# Patient Record
Sex: FEMALE | Race: WHITE | NOT HISPANIC OR LATINO | Employment: FULL TIME | URBAN - NONMETROPOLITAN AREA
[De-identification: names, ages, dates, MRNs, and addresses within clinical notes are randomized per-mention and may not be internally consistent; named-entity substitution may affect disease eponyms.]

---

## 2018-03-05 DIAGNOSIS — R07.89 CHEST PRESSURE: Primary | ICD-10-CM

## 2018-03-08 DIAGNOSIS — R07.89 CHEST PRESSURE: Primary | ICD-10-CM

## 2018-03-23 ENCOUNTER — TELEPHONE (OUTPATIENT)
Dept: CARDIOLOGY | Facility: OTHER | Age: 58
End: 2018-03-23

## 2018-03-23 NOTE — TELEPHONE ENCOUNTER
Patient verified .  Reminder call for stress test with instructions given.  Patient verbalized understanding.

## 2018-03-30 ENCOUNTER — HOSPITAL ENCOUNTER (OUTPATIENT)
Dept: MRI IMAGING | Facility: OTHER | Age: 58
Discharge: HOME | End: 2018-03-30
Attending: PHYSICIAN ASSISTANT | Admitting: PHYSICIAN ASSISTANT

## 2018-03-30 DIAGNOSIS — G89.29 CHRONIC NONINTRACTABLE HEADACHE, UNSPECIFIED HEADACHE TYPE: ICD-10-CM

## 2018-03-30 DIAGNOSIS — R51.9 CHRONIC NONINTRACTABLE HEADACHE, UNSPECIFIED HEADACHE TYPE: ICD-10-CM

## 2018-03-30 PROCEDURE — 25500064 ZZH RX 255 OP 636: Performed by: FAMILY MEDICINE

## 2018-03-30 PROCEDURE — 70553 MRI BRAIN STEM W/O & W/DYE: CPT

## 2018-03-30 PROCEDURE — A9575 INJ GADOTERATE MEGLUMI 0.1ML: HCPCS | Performed by: FAMILY MEDICINE

## 2018-03-30 RX ORDER — GADOTERATE MEGLUMINE 376.9 MG/ML
15 INJECTION INTRAVENOUS ONCE
Status: COMPLETED | OUTPATIENT
Start: 2018-03-30 | End: 2018-03-30

## 2018-03-30 RX ADMIN — GADOTERATE MEGLUMINE 15 ML: 376.9 INJECTION INTRAVENOUS at 09:10

## 2021-10-18 ENCOUNTER — LAB (OUTPATIENT)
Dept: LAB | Facility: OTHER | Age: 61
End: 2021-10-18
Attending: CHIROPRACTOR

## 2021-10-18 DIAGNOSIS — Z01.89 LABORATORY PROCEDURE: Primary | ICD-10-CM

## 2021-10-18 PROCEDURE — 36415 COLL VENOUS BLD VENIPUNCTURE: CPT | Mod: ZL

## 2024-05-15 ENCOUNTER — HOSPITAL ENCOUNTER (EMERGENCY)
Facility: OTHER | Age: 64
Discharge: HOME | End: 2024-05-15
Attending: FAMILY MEDICINE | Admitting: FAMILY MEDICINE

## 2024-05-15 ENCOUNTER — ANESTHESIA (OUTPATIENT)
Dept: SURGERY | Facility: OTHER | Age: 64
End: 2024-05-15

## 2024-05-15 ENCOUNTER — APPOINTMENT (OUTPATIENT)
Dept: CT IMAGING | Facility: OTHER | Age: 64
End: 2024-05-15
Attending: FAMILY MEDICINE

## 2024-05-15 ENCOUNTER — ANESTHESIA EVENT (OUTPATIENT)
Dept: SURGERY | Facility: OTHER | Age: 64
End: 2024-05-15

## 2024-05-15 VITALS
DIASTOLIC BLOOD PRESSURE: 73 MMHG | HEIGHT: 60 IN | BODY MASS INDEX: 27.48 KG/M2 | WEIGHT: 140 LBS | OXYGEN SATURATION: 97 % | TEMPERATURE: 97.1 F | HEART RATE: 95 BPM | SYSTOLIC BLOOD PRESSURE: 110 MMHG | RESPIRATION RATE: 16 BRPM

## 2024-05-15 DIAGNOSIS — K35.30 ACUTE APPENDICITIS WITH LOCALIZED PERITONITIS, WITHOUT PERFORATION, ABSCESS, OR GANGRENE: Primary | ICD-10-CM

## 2024-05-15 LAB
ALBUMIN SERPL BCG-MCNC: 4.7 G/DL (ref 3.5–5.2)
ALBUMIN UR-MCNC: NEGATIVE MG/DL
ALP SERPL-CCNC: 78 U/L (ref 40–150)
ALT SERPL W P-5'-P-CCNC: 29 U/L (ref 0–50)
ANION GAP SERPL CALCULATED.3IONS-SCNC: 15 MMOL/L (ref 7–15)
APPEARANCE UR: CLEAR
AST SERPL W P-5'-P-CCNC: 19 U/L (ref 0–45)
BASOPHILS # BLD AUTO: 0.1 10E3/UL (ref 0–0.2)
BASOPHILS NFR BLD AUTO: 0 %
BILIRUB SERPL-MCNC: 0.3 MG/DL
BILIRUB UR QL STRIP: NEGATIVE
BUN SERPL-MCNC: 24.5 MG/DL (ref 8–23)
CALCIUM SERPL-MCNC: 9.4 MG/DL (ref 8.8–10.2)
CHLORIDE SERPL-SCNC: 102 MMOL/L (ref 98–107)
COLOR UR AUTO: ABNORMAL
CREAT SERPL-MCNC: 0.61 MG/DL (ref 0.51–0.95)
DEPRECATED HCO3 PLAS-SCNC: 21 MMOL/L (ref 22–29)
EGFRCR SERPLBLD CKD-EPI 2021: >90 ML/MIN/1.73M2
EOSINOPHIL # BLD AUTO: 0 10E3/UL (ref 0–0.7)
EOSINOPHIL NFR BLD AUTO: 0 %
ERYTHROCYTE [DISTWIDTH] IN BLOOD BY AUTOMATED COUNT: 12.3 % (ref 10–15)
GLUCOSE SERPL-MCNC: 128 MG/DL (ref 70–99)
GLUCOSE UR STRIP-MCNC: NEGATIVE MG/DL
HCT VFR BLD AUTO: 39.6 % (ref 35–47)
HGB BLD-MCNC: 13.2 G/DL (ref 11.7–15.7)
HGB UR QL STRIP: NEGATIVE
HOLD SPECIMEN: NORMAL
HOLD SPECIMEN: NORMAL
IMM GRANULOCYTES # BLD: 0.1 10E3/UL
IMM GRANULOCYTES NFR BLD: 0 %
KETONES UR STRIP-MCNC: ABNORMAL MG/DL
LACTATE SERPL-SCNC: 1.2 MMOL/L (ref 0.7–2)
LACTATE SERPL-SCNC: 2.9 MMOL/L (ref 0.7–2)
LEUKOCYTE ESTERASE UR QL STRIP: NEGATIVE
LIPASE SERPL-CCNC: 50 U/L (ref 13–60)
LYMPHOCYTES # BLD AUTO: 2.1 10E3/UL (ref 0.8–5.3)
LYMPHOCYTES NFR BLD AUTO: 13 %
MCH RBC QN AUTO: 29 PG (ref 26.5–33)
MCHC RBC AUTO-ENTMCNC: 33.3 G/DL (ref 31.5–36.5)
MCV RBC AUTO: 87 FL (ref 78–100)
MONOCYTES # BLD AUTO: 1 10E3/UL (ref 0–1.3)
MONOCYTES NFR BLD AUTO: 6 %
MUCOUS THREADS #/AREA URNS LPF: PRESENT /LPF
NEUTROPHILS # BLD AUTO: 13.3 10E3/UL (ref 1.6–8.3)
NEUTROPHILS NFR BLD AUTO: 81 %
NITRATE UR QL: NEGATIVE
NRBC # BLD AUTO: 0 10E3/UL
NRBC BLD AUTO-RTO: 0 /100
PH UR STRIP: 7 [PH] (ref 5–9)
PLATELET # BLD AUTO: 318 10E3/UL (ref 150–450)
POTASSIUM SERPL-SCNC: 4 MMOL/L (ref 3.4–5.3)
PROT SERPL-MCNC: 7.8 G/DL (ref 6.4–8.3)
RBC # BLD AUTO: 4.55 10E6/UL (ref 3.8–5.2)
RBC URINE: 2 /HPF
SODIUM SERPL-SCNC: 138 MMOL/L (ref 135–145)
SP GR UR STRIP: 1.04 (ref 1–1.03)
TROPONIN T SERPL HS-MCNC: 10 NG/L
UROBILINOGEN UR STRIP-MCNC: NORMAL MG/DL
WBC # BLD AUTO: 16.5 10E3/UL (ref 4–11)
WBC URINE: <1 /HPF

## 2024-05-15 PROCEDURE — 99285 EMERGENCY DEPT VISIT HI MDM: CPT | Mod: 25 | Performed by: FAMILY MEDICINE

## 2024-05-15 PROCEDURE — 88304 TISSUE EXAM BY PATHOLOGIST: CPT

## 2024-05-15 PROCEDURE — 250N000025 HC SEVOFLURANE, PER MIN: Performed by: SURGERY

## 2024-05-15 PROCEDURE — 83605 ASSAY OF LACTIC ACID: CPT | Performed by: FAMILY MEDICINE

## 2024-05-15 PROCEDURE — 360N000076 HC SURGERY LEVEL 3, PER MIN: Performed by: SURGERY

## 2024-05-15 PROCEDURE — 250N000011 HC RX IP 250 OP 636: Performed by: FAMILY MEDICINE

## 2024-05-15 PROCEDURE — 999N000141 HC STATISTIC PRE-PROCEDURE NURSING ASSESSMENT: Performed by: SURGERY

## 2024-05-15 PROCEDURE — 96375 TX/PRO/DX INJ NEW DRUG ADDON: CPT | Performed by: FAMILY MEDICINE

## 2024-05-15 PROCEDURE — 258N000003 HC RX IP 258 OP 636

## 2024-05-15 PROCEDURE — 96365 THER/PROPH/DIAG IV INF INIT: CPT | Mod: XU | Performed by: FAMILY MEDICINE

## 2024-05-15 PROCEDURE — 250N000011 HC RX IP 250 OP 636

## 2024-05-15 PROCEDURE — 36415 COLL VENOUS BLD VENIPUNCTURE: CPT | Performed by: FAMILY MEDICINE

## 2024-05-15 PROCEDURE — 250N000011 HC RX IP 250 OP 636: Performed by: SURGERY

## 2024-05-15 PROCEDURE — 258N000003 HC RX IP 258 OP 636: Performed by: NURSE ANESTHETIST, CERTIFIED REGISTERED

## 2024-05-15 PROCEDURE — 44970 LAPAROSCOPY APPENDECTOMY: CPT | Performed by: SURGERY

## 2024-05-15 PROCEDURE — 99285 EMERGENCY DEPT VISIT HI MDM: CPT | Performed by: FAMILY MEDICINE

## 2024-05-15 PROCEDURE — 710N000012 HC RECOVERY PHASE 2, PER MINUTE: Performed by: SURGERY

## 2024-05-15 PROCEDURE — 84484 ASSAY OF TROPONIN QUANT: CPT | Performed by: FAMILY MEDICINE

## 2024-05-15 PROCEDURE — 96376 TX/PRO/DX INJ SAME DRUG ADON: CPT | Performed by: FAMILY MEDICINE

## 2024-05-15 PROCEDURE — 258N000003 HC RX IP 258 OP 636: Performed by: FAMILY MEDICINE

## 2024-05-15 PROCEDURE — 83690 ASSAY OF LIPASE: CPT | Performed by: FAMILY MEDICINE

## 2024-05-15 PROCEDURE — 250N000013 HC RX MED GY IP 250 OP 250 PS 637: Performed by: SURGERY

## 2024-05-15 PROCEDURE — 99140 ANES COMP EMERGENCY COND: CPT

## 2024-05-15 PROCEDURE — 44970 LAPAROSCOPY APPENDECTOMY: CPT

## 2024-05-15 PROCEDURE — 250N000009 HC RX 250: Performed by: SURGERY

## 2024-05-15 PROCEDURE — 710N000010 HC RECOVERY PHASE 1, LEVEL 2, PER MIN: Performed by: SURGERY

## 2024-05-15 PROCEDURE — 81001 URINALYSIS AUTO W/SCOPE: CPT | Performed by: FAMILY MEDICINE

## 2024-05-15 PROCEDURE — 85025 COMPLETE CBC W/AUTO DIFF WBC: CPT | Performed by: FAMILY MEDICINE

## 2024-05-15 PROCEDURE — 74177 CT ABD & PELVIS W/CONTRAST: CPT

## 2024-05-15 PROCEDURE — 250N000013 HC RX MED GY IP 250 OP 250 PS 637

## 2024-05-15 PROCEDURE — 272N000001 HC OR GENERAL SUPPLY STERILE: Performed by: SURGERY

## 2024-05-15 PROCEDURE — 370N000017 HC ANESTHESIA TECHNICAL FEE, PER MIN: Performed by: SURGERY

## 2024-05-15 PROCEDURE — 80053 COMPREHEN METABOLIC PANEL: CPT | Performed by: FAMILY MEDICINE

## 2024-05-15 PROCEDURE — 250N000009 HC RX 250

## 2024-05-15 RX ORDER — NALOXONE HYDROCHLORIDE 0.4 MG/ML
0.4 INJECTION, SOLUTION INTRAMUSCULAR; INTRAVENOUS; SUBCUTANEOUS
Status: DISCONTINUED | OUTPATIENT
Start: 2024-05-15 | End: 2024-05-15 | Stop reason: HOSPADM

## 2024-05-15 RX ORDER — BUPIVACAINE HYDROCHLORIDE AND EPINEPHRINE 5; 5 MG/ML; UG/ML
INJECTION, SOLUTION EPIDURAL; INTRACAUDAL; PERINEURAL PRN
Status: DISCONTINUED | OUTPATIENT
Start: 2024-05-15 | End: 2024-05-15 | Stop reason: HOSPADM

## 2024-05-15 RX ORDER — ALBUTEROL SULFATE 90 UG/1
AEROSOL, METERED RESPIRATORY (INHALATION) PRN
Status: DISCONTINUED | OUTPATIENT
Start: 2024-05-15 | End: 2024-05-15

## 2024-05-15 RX ORDER — KETOROLAC TROMETHAMINE 30 MG/ML
30 INJECTION, SOLUTION INTRAMUSCULAR; INTRAVENOUS ONCE
Status: COMPLETED | OUTPATIENT
Start: 2024-05-15 | End: 2024-05-15

## 2024-05-15 RX ORDER — ONDANSETRON 2 MG/ML
4 INJECTION INTRAMUSCULAR; INTRAVENOUS EVERY 30 MIN PRN
Status: DISCONTINUED | OUTPATIENT
Start: 2024-05-15 | End: 2024-05-15 | Stop reason: HOSPADM

## 2024-05-15 RX ORDER — LIDOCAINE 40 MG/G
CREAM TOPICAL
Status: DISCONTINUED | OUTPATIENT
Start: 2024-05-15 | End: 2024-05-15 | Stop reason: HOSPADM

## 2024-05-15 RX ORDER — SODIUM CHLORIDE, SODIUM LACTATE, POTASSIUM CHLORIDE, CALCIUM CHLORIDE 600; 310; 30; 20 MG/100ML; MG/100ML; MG/100ML; MG/100ML
INJECTION, SOLUTION INTRAVENOUS CONTINUOUS
Status: DISCONTINUED | OUTPATIENT
Start: 2024-05-15 | End: 2024-05-15 | Stop reason: HOSPADM

## 2024-05-15 RX ORDER — ONDANSETRON 2 MG/ML
4 INJECTION INTRAMUSCULAR; INTRAVENOUS ONCE
Status: COMPLETED | OUTPATIENT
Start: 2024-05-15 | End: 2024-05-15

## 2024-05-15 RX ORDER — PROPOFOL 10 MG/ML
INJECTION, EMULSION INTRAVENOUS PRN
Status: DISCONTINUED | OUTPATIENT
Start: 2024-05-15 | End: 2024-05-15

## 2024-05-15 RX ORDER — ACETAMINOPHEN 325 MG/1
975 TABLET ORAL ONCE
Status: COMPLETED | OUTPATIENT
Start: 2024-05-15 | End: 2024-05-15

## 2024-05-15 RX ORDER — HYDROMORPHONE HYDROCHLORIDE 1 MG/ML
0.5 INJECTION, SOLUTION INTRAMUSCULAR; INTRAVENOUS; SUBCUTANEOUS ONCE
Status: COMPLETED | OUTPATIENT
Start: 2024-05-15 | End: 2024-05-15

## 2024-05-15 RX ORDER — GLYCOPYRROLATE 0.2 MG/ML
INJECTION, SOLUTION INTRAMUSCULAR; INTRAVENOUS PRN
Status: DISCONTINUED | OUTPATIENT
Start: 2024-05-15 | End: 2024-05-15

## 2024-05-15 RX ORDER — HYDROMORPHONE HCL IN WATER/PF 6 MG/30 ML
0.2 PATIENT CONTROLLED ANALGESIA SYRINGE INTRAVENOUS EVERY 5 MIN PRN
Status: DISCONTINUED | OUTPATIENT
Start: 2024-05-15 | End: 2024-05-15 | Stop reason: HOSPADM

## 2024-05-15 RX ORDER — NALOXONE HYDROCHLORIDE 0.4 MG/ML
0.1 INJECTION, SOLUTION INTRAMUSCULAR; INTRAVENOUS; SUBCUTANEOUS
Status: DISCONTINUED | OUTPATIENT
Start: 2024-05-15 | End: 2024-05-15 | Stop reason: HOSPADM

## 2024-05-15 RX ORDER — DEXAMETHASONE SODIUM PHOSPHATE 4 MG/ML
INJECTION, SOLUTION INTRA-ARTICULAR; INTRALESIONAL; INTRAMUSCULAR; INTRAVENOUS; SOFT TISSUE PRN
Status: DISCONTINUED | OUTPATIENT
Start: 2024-05-15 | End: 2024-05-15

## 2024-05-15 RX ORDER — FENTANYL CITRATE 50 UG/ML
50 INJECTION, SOLUTION INTRAMUSCULAR; INTRAVENOUS EVERY 5 MIN PRN
Status: DISCONTINUED | OUTPATIENT
Start: 2024-05-15 | End: 2024-05-15 | Stop reason: HOSPADM

## 2024-05-15 RX ORDER — ONDANSETRON 4 MG/1
4 TABLET, ORALLY DISINTEGRATING ORAL EVERY 30 MIN PRN
Status: DISCONTINUED | OUTPATIENT
Start: 2024-05-15 | End: 2024-05-15 | Stop reason: HOSPADM

## 2024-05-15 RX ORDER — LIDOCAINE HYDROCHLORIDE 20 MG/ML
INJECTION, SOLUTION INFILTRATION; PERINEURAL PRN
Status: DISCONTINUED | OUTPATIENT
Start: 2024-05-15 | End: 2024-05-15

## 2024-05-15 RX ORDER — HYDROMORPHONE HCL IN WATER/PF 6 MG/30 ML
0.4 PATIENT CONTROLLED ANALGESIA SYRINGE INTRAVENOUS EVERY 5 MIN PRN
Status: DISCONTINUED | OUTPATIENT
Start: 2024-05-15 | End: 2024-05-15 | Stop reason: HOSPADM

## 2024-05-15 RX ORDER — DEXAMETHASONE SODIUM PHOSPHATE 4 MG/ML
4 INJECTION, SOLUTION INTRA-ARTICULAR; INTRALESIONAL; INTRAMUSCULAR; INTRAVENOUS; SOFT TISSUE
Status: DISCONTINUED | OUTPATIENT
Start: 2024-05-15 | End: 2024-05-15 | Stop reason: HOSPADM

## 2024-05-15 RX ORDER — ONDANSETRON 2 MG/ML
INJECTION INTRAMUSCULAR; INTRAVENOUS PRN
Status: DISCONTINUED | OUTPATIENT
Start: 2024-05-15 | End: 2024-05-15

## 2024-05-15 RX ORDER — FENTANYL CITRATE 50 UG/ML
25 INJECTION, SOLUTION INTRAMUSCULAR; INTRAVENOUS EVERY 5 MIN PRN
Status: DISCONTINUED | OUTPATIENT
Start: 2024-05-15 | End: 2024-05-15 | Stop reason: HOSPADM

## 2024-05-15 RX ORDER — SCOLOPAMINE TRANSDERMAL SYSTEM 1 MG/1
1 PATCH, EXTENDED RELEASE TRANSDERMAL ONCE
Status: DISCONTINUED | OUTPATIENT
Start: 2024-05-15 | End: 2024-05-15 | Stop reason: HOSPADM

## 2024-05-15 RX ORDER — NALOXONE HYDROCHLORIDE 0.4 MG/ML
0.2 INJECTION, SOLUTION INTRAMUSCULAR; INTRAVENOUS; SUBCUTANEOUS
Status: DISCONTINUED | OUTPATIENT
Start: 2024-05-15 | End: 2024-05-15 | Stop reason: HOSPADM

## 2024-05-15 RX ORDER — FENTANYL CITRATE 50 UG/ML
INJECTION, SOLUTION INTRAMUSCULAR; INTRAVENOUS PRN
Status: DISCONTINUED | OUTPATIENT
Start: 2024-05-15 | End: 2024-05-15

## 2024-05-15 RX ORDER — HYDROMORPHONE HYDROCHLORIDE 1 MG/ML
0.5 INJECTION, SOLUTION INTRAMUSCULAR; INTRAVENOUS; SUBCUTANEOUS EVERY 30 MIN PRN
Status: DISCONTINUED | OUTPATIENT
Start: 2024-05-15 | End: 2024-05-15 | Stop reason: HOSPADM

## 2024-05-15 RX ORDER — IOPAMIDOL 755 MG/ML
81 INJECTION, SOLUTION INTRAVASCULAR ONCE
Status: COMPLETED | OUTPATIENT
Start: 2024-05-15 | End: 2024-05-15

## 2024-05-15 RX ORDER — ACETAMINOPHEN 325 MG/1
650 TABLET ORAL EVERY 4 HOURS PRN
Qty: 50 TABLET | Refills: 0 | Status: SHIPPED | OUTPATIENT
Start: 2024-05-15

## 2024-05-15 RX ORDER — IBUPROFEN 200 MG
600 TABLET ORAL EVERY 6 HOURS PRN
COMMUNITY
Start: 2024-05-15

## 2024-05-15 RX ORDER — HYDROCODONE BITARTRATE AND ACETAMINOPHEN 5; 325 MG/1; MG/1
1 TABLET ORAL
Status: DISCONTINUED | OUTPATIENT
Start: 2024-05-15 | End: 2024-05-15 | Stop reason: HOSPADM

## 2024-05-15 RX ORDER — KETOROLAC TROMETHAMINE 30 MG/ML
30 INJECTION, SOLUTION INTRAMUSCULAR; INTRAVENOUS EVERY 6 HOURS PRN
Status: DISCONTINUED | OUTPATIENT
Start: 2024-05-15 | End: 2024-05-15 | Stop reason: HOSPADM

## 2024-05-15 RX ORDER — KETAMINE HYDROCHLORIDE 10 MG/ML
INJECTION INTRAMUSCULAR; INTRAVENOUS PRN
Status: DISCONTINUED | OUTPATIENT
Start: 2024-05-15 | End: 2024-05-15

## 2024-05-15 RX ORDER — PIPERACILLIN SODIUM, TAZOBACTAM SODIUM 3; .375 G/15ML; G/15ML
3.38 INJECTION, POWDER, LYOPHILIZED, FOR SOLUTION INTRAVENOUS EVERY 6 HOURS
Status: DISCONTINUED | OUTPATIENT
Start: 2024-05-15 | End: 2024-05-15 | Stop reason: HOSPADM

## 2024-05-15 RX ADMIN — PROPOFOL 150 MG: 10 INJECTION, EMULSION INTRAVENOUS at 13:45

## 2024-05-15 RX ADMIN — KETOROLAC TROMETHAMINE 30 MG: 30 INJECTION, SOLUTION INTRAMUSCULAR at 08:44

## 2024-05-15 RX ADMIN — HYDROMORPHONE HYDROCHLORIDE 0.5 MG: 1 INJECTION, SOLUTION INTRAMUSCULAR; INTRAVENOUS; SUBCUTANEOUS at 08:44

## 2024-05-15 RX ADMIN — SODIUM CHLORIDE 1000 ML: 9 INJECTION, SOLUTION INTRAVENOUS at 08:01

## 2024-05-15 RX ADMIN — FENTANYL CITRATE 50 MCG: 50 INJECTION INTRAMUSCULAR; INTRAVENOUS at 13:45

## 2024-05-15 RX ADMIN — IOPAMIDOL 81 ML: 755 INJECTION, SOLUTION INTRAVENOUS at 07:50

## 2024-05-15 RX ADMIN — ROCURONIUM BROMIDE 5 MG: 50 INJECTION, SOLUTION INTRAVENOUS at 13:40

## 2024-05-15 RX ADMIN — SODIUM CHLORIDE, SODIUM LACTATE, POTASSIUM CHLORIDE, AND CALCIUM CHLORIDE: 600; 310; 30; 20 INJECTION, SOLUTION INTRAVENOUS at 14:18

## 2024-05-15 RX ADMIN — HYDROMORPHONE HYDROCHLORIDE 0.5 MG: 1 INJECTION, SOLUTION INTRAMUSCULAR; INTRAVENOUS; SUBCUTANEOUS at 06:55

## 2024-05-15 RX ADMIN — MIDAZOLAM HYDROCHLORIDE 2 MG: 1 INJECTION, SOLUTION INTRAMUSCULAR; INTRAVENOUS at 13:40

## 2024-05-15 RX ADMIN — ONDANSETRON 4 MG: 2 INJECTION INTRAMUSCULAR; INTRAVENOUS at 06:55

## 2024-05-15 RX ADMIN — ALBUTEROL SULFATE 6 PUFF: 90 AEROSOL, METERED RESPIRATORY (INHALATION) at 14:40

## 2024-05-15 RX ADMIN — LIDOCAINE HYDROCHLORIDE 60 MG: 20 INJECTION, SOLUTION INFILTRATION; PERINEURAL at 13:45

## 2024-05-15 RX ADMIN — DEXAMETHASONE SODIUM PHOSPHATE 4 MG: 4 INJECTION, SOLUTION INTRA-ARTICULAR; INTRALESIONAL; INTRAMUSCULAR; INTRAVENOUS; SOFT TISSUE at 13:50

## 2024-05-15 RX ADMIN — KETOROLAC TROMETHAMINE 30 MG: 30 INJECTION, SOLUTION INTRAMUSCULAR at 15:48

## 2024-05-15 RX ADMIN — Medication 10 MG: at 14:15

## 2024-05-15 RX ADMIN — SUGAMMADEX 200 MG: 100 INJECTION, SOLUTION INTRAVENOUS at 14:31

## 2024-05-15 RX ADMIN — PIPERACILLIN AND TAZOBACTAM 3.38 G: 3; .375 INJECTION, POWDER, LYOPHILIZED, FOR SOLUTION INTRAVENOUS at 09:24

## 2024-05-15 RX ADMIN — Medication 20 MG: at 13:45

## 2024-05-15 RX ADMIN — GLYCOPYRROLATE 0.1 MG: 0.2 INJECTION, SOLUTION INTRAMUSCULAR; INTRAVENOUS at 13:40

## 2024-05-15 RX ADMIN — FENTANYL CITRATE 50 MCG: 50 INJECTION INTRAMUSCULAR; INTRAVENOUS at 14:15

## 2024-05-15 RX ADMIN — SCOPALAMINE 1 PATCH: 1 PATCH, EXTENDED RELEASE TRANSDERMAL at 13:00

## 2024-05-15 RX ADMIN — ROCURONIUM BROMIDE 35 MG: 50 INJECTION, SOLUTION INTRAVENOUS at 13:45

## 2024-05-15 RX ADMIN — PROPOFOL 50 MG: 10 INJECTION, EMULSION INTRAVENOUS at 13:51

## 2024-05-15 RX ADMIN — PHENYLEPHRINE HYDROCHLORIDE 100 MCG: 10 INJECTION INTRAVENOUS at 13:55

## 2024-05-15 RX ADMIN — ACETAMINOPHEN 975 MG: 325 TABLET, FILM COATED ORAL at 12:59

## 2024-05-15 RX ADMIN — DEXMEDETOMIDINE HYDROCHLORIDE 4 MCG: 100 INJECTION, SOLUTION INTRAVENOUS at 14:04

## 2024-05-15 RX ADMIN — ROCURONIUM BROMIDE 10 MG: 50 INJECTION, SOLUTION INTRAVENOUS at 14:15

## 2024-05-15 RX ADMIN — SODIUM CHLORIDE, SODIUM LACTATE, POTASSIUM CHLORIDE, AND CALCIUM CHLORIDE: 600; 310; 30; 20 INJECTION, SOLUTION INTRAVENOUS at 09:58

## 2024-05-15 RX ADMIN — ONDANSETRON HYDROCHLORIDE 4 MG: 2 SOLUTION INTRAMUSCULAR; INTRAVENOUS at 13:50

## 2024-05-15 RX ADMIN — DEXMEDETOMIDINE HYDROCHLORIDE 4 MCG: 100 INJECTION, SOLUTION INTRAVENOUS at 14:16

## 2024-05-15 ASSESSMENT — ENCOUNTER SYMPTOMS
CHILLS: 1
WEAKNESS: 0
DIFFICULTY URINATING: 0
APPETITE CHANGE: 1
DYSURIA: 0
CONFUSION: 0
COUGH: 0
LIGHT-HEADEDNESS: 0
SHORTNESS OF BREATH: 0
FATIGUE: 1
BACK PAIN: 1
VOMITING: 1
DYSPHORIC MOOD: 0
HEADACHES: 0
NAUSEA: 1
TROUBLE SWALLOWING: 0
BLOOD IN STOOL: 0
ABDOMINAL PAIN: 1
FEVER: 0
DIARRHEA: 1

## 2024-05-15 ASSESSMENT — ACTIVITIES OF DAILY LIVING (ADL)
ADLS_ACUITY_SCORE: 35

## 2024-05-15 ASSESSMENT — COLUMBIA-SUICIDE SEVERITY RATING SCALE - C-SSRS
1. IN THE PAST MONTH, HAVE YOU WISHED YOU WERE DEAD OR WISHED YOU COULD GO TO SLEEP AND NOT WAKE UP?: NO
2. HAVE YOU ACTUALLY HAD ANY THOUGHTS OF KILLING YOURSELF IN THE PAST MONTH?: NO
6. HAVE YOU EVER DONE ANYTHING, STARTED TO DO ANYTHING, OR PREPARED TO DO ANYTHING TO END YOUR LIFE?: NO

## 2024-05-15 NOTE — DISCHARGE INSTRUCTIONS
Procedure you had done: laparoscopic appendenctomy  Your health care provider is:  Charlie, Non-Provider  Your surgeon is Dr. Zahida Catalan.   Please call your health care provider or surgeon at [phone] if:    - you feel you are getting worse or having an increase in problems    - fever greater than 101 degrees  - increasing shortness of breath or chest pain  - any signs of infection (increasing redness, swelling, tenderness, warmth, change in appearance, or  increased drainage)  - nausea (upset stomach) and vomiting and/or diarrhea that will not stop  - severe pain that is not relieved by medicine, rest or ice    Home care :  You will be discharged when you are safe to go home. Anesthesia can change judgment, reaction time and coordination for several hours after you seem back to normal. Therefore, do not operate any motorized vehicles or power tools for 24 hours after discharge.     Activity:  You should rest or do quiet activities for the rest of the day. The day after surgery you may be as active as you feel able. You may find that you require more rest than usual the first 3-4 days as your body heals.      Diet:  Eat small amounts frequently after arriving home. Avoid foods that are hard to digest such as heavy, sweet, spicy, or fried foods until you are feeling well.   Vomiting can occur after general anesthesia. If vomiting lasts more than 5-7 times after arriving home, or if you have other difficulties, call your doctor.     Other:  Problems urinating can happen after surgery.  Please call your physician if you have any problems.  Some people have constipation after surgery-you can use over the counter stool softener or laxative as needed.  You can use ice on the area of the incision to help with pain and swelling. Apply an ice pack wrapped in a towel to the area for 10-15 minutes once an hour.   Dressing:  Your incision was covered with Steri-strips and a bandage. The bandage can be removed and you can  shower 24 hours or more after the surgery. After you shower dry your incision gently. You may apply a clean bandage if you want to. The Steri-strips will stay on for 5-7 days.   No soaking in a bath, hot tub, pool or lake for 5 days.      Drainage:   Bleeding or drainage should be minimal.  If bleeding soaks the dressings, cover with another sterile dressing.  If bleeding continues, apply gentle steady pressure over the incision for 5 minutes.  If bleeding persists or there is an increased swelling of the area, call your surgeon or go to the Emergency Room.        [facility]  Adult Discharge Orders & Instructions      For 24 hours after surgery:  Get plenty of rest.  A responsible adult must stay with you for at least 24 hours after you leave the hospital.   You may feel lightheaded.  IF so, sit for a few minutes before standing.  Have someone help you get up.   You may have a slight fever. Call the doctor if your fever is over 101 F (38.3 C) (taken under the tongue) or lasts longer than 24 hours.  You may have a dry mouth, a sore throat, muscle aches or trouble sleeping.  These should go away after 24 hours.  Do not make important or legal decisions.  6.   Do not drive or use heavy equipment.  If you have weakness or tingling, don't drive or use heavy equipment until this feeling goes away.                                                                                                                                                                         To contact a doctor, call    [phone]______________

## 2024-05-15 NOTE — ED PROVIDER NOTES
"  History     Chief Complaint   Patient presents with    Abdominal Pain    Vomiting    Fatigue     HPI  Opal Bridges is a 63 year old female with chronic diarrhea but no other significant PMH who presented to the ER with complaints of sudden onset of lower abdominal pain at 0300.  Felt fine when she went to bed.  The pain was initially 5/10, not relieved by position changes.  It increased to 7/10 with episodes of increasing to 10/10.  When that severe was accompanied by nausea.  Tried taking some charcoal and papaya in case \"it was something I ate\" but no relief.  Describes the pain as cramping and constant.  Abdominal surgeries include 3 c-sections and a TL.  No chronic health problems, takes no regular medications.      Allergies:  Allergies   Allergen Reactions    Codeine Nausea and Vomiting and Hives       Problem List:    There are no problems to display for this patient.       Past Medical History:    No past medical history on file.    Past Surgical History:    No past surgical history on file.  3 c-sections    Family History:    No family history on file.    Social History:  Marital Status:   [2]  Works with her  in an [employer] business.        Medications:    No current outpatient medications on file.        Review of Systems   Constitutional:  Positive for appetite change, chills and fatigue. Negative for fever.   HENT:  Negative for congestion and trouble swallowing.    Eyes:  Negative for visual disturbance.   Respiratory:  Negative for cough and shortness of breath.    Cardiovascular:  Negative for chest pain.   Gastrointestinal:  Positive for abdominal pain, diarrhea, nausea and vomiting. Negative for blood in stool.   Genitourinary:  Negative for difficulty urinating and dysuria.   Musculoskeletal:  Positive for back pain.        Some back pain when the abdominal pain is worst.   Skin:  Negative for rash.   Neurological:  Negative for weakness, light-headedness and headaches. "   Psychiatric/Behavioral:  Negative for confusion and dysphoric mood.        Physical Exam   BP: 135/83  Pulse: 75  Temp: 97.2  F (36.2  C)  Resp: 16  Height: 152.4 cm (5')  Weight: 63.5 kg (140 lb)  SpO2: 99 %      Physical Exam  Vitals and nursing note reviewed.   Constitutional:       General: She is not in acute distress.     Appearance: She is well-developed.      Comments: Resting on the ER cart, lays still, answers questions easily with clear descriptions.   HENT:      Head: Normocephalic and atraumatic.      Mouth/Throat:      Mouth: Mucous membranes are moist.   Eyes:      Extraocular Movements: Extraocular movements intact.      Pupils: Pupils are equal, round, and reactive to light.   Cardiovascular:      Rate and Rhythm: Normal rate and regular rhythm.      Heart sounds: No murmur heard.  Pulmonary:      Effort: No respiratory distress.      Breath sounds: No wheezing or rales.   Abdominal:      General: Abdomen is flat. Bowel sounds are increased. There is no distension.      Palpations: Abdomen is soft.      Tenderness: There is generalized abdominal tenderness. There is no guarding or rebound.   Skin:     General: Skin is warm and dry.   Neurological:      General: No focal deficit present.      Mental Status: She is alert and oriented to person, place, and time.      Cranial Nerves: No cranial nerve deficit.   Psychiatric:         Mood and Affect: Mood normal.         Behavior: Behavior normal.      Comments: Sleepy after receiving Dilaudid but able to answer questions appropriately.         ED Course     ED Course as of 05/15/24 0842   Wed May 15, 2024   0815 Radiologist called to report acute appendicitis noted on CT.  Patient notified of results.  Dr. Catalan texted.   7966 Dr. Catalan here to see the patient.     Procedures              Critical Care time:  none     The Lactic acid level is elevated due to acute appendicitis, at this time there is no sign of severe sepsis or septic shock. VS normal.   Given some IV fluids and abx will be ordered by Dr. Catalan.         Results for orders placed or performed during the hospital encounter of 05/15/24 (from the past 24 hour(s))   CBC with platelets differential    Narrative    The following orders were created for panel order CBC with platelets differential.  Procedure                               Abnormality         Status                     ---------                               -----------         ------                     CBC with platelets and d...[[id number]]  Abnormal            Final result                 Please view results for these tests on the individual orders.   Comprehensive metabolic panel   Result Value Ref Range    Sodium 138 135 - 145 mmol/L    Potassium 4.0 3.4 - 5.3 mmol/L    Carbon Dioxide (CO2) 21 (L) 22 - 29 mmol/L    Anion Gap 15 7 - 15 mmol/L    Urea Nitrogen 24.5 (H) 8.0 - 23.0 mg/dL    Creatinine 0.61 0.51 - 0.95 mg/dL    GFR Estimate >90 >60 mL/min/1.73m2    Calcium 9.4 8.8 - 10.2 mg/dL    Chloride 102 98 - 107 mmol/L    Glucose 128 (H) 70 - 99 mg/dL    Alkaline Phosphatase 78 40 - 150 U/L    AST 19 0 - 45 U/L    ALT 29 0 - 50 U/L    Protein Total 7.8 6.4 - 8.3 g/dL    Albumin 4.7 3.5 - 5.2 g/dL    Bilirubin Total 0.3 <=1.2 mg/dL   Lipase   Result Value Ref Range    Lipase 50 13 - 60 U/L   Troponin T, High Sensitivity   Result Value Ref Range    Troponin T, High Sensitivity 10 <=14 ng/L   CBC with platelets and differential   Result Value Ref Range    WBC Count 16.5 (H) 4.0 - 11.0 10e3/uL    RBC Count 4.55 3.80 - 5.20 10e6/uL    Hemoglobin 13.2 11.7 - 15.7 g/dL    Hematocrit 39.6 35.0 - 47.0 %    MCV 87 78 - 100 fL    MCH 29.0 26.5 - 33.0 pg    MCHC 33.3 31.5 - 36.5 g/dL    RDW 12.3 10.0 - 15.0 %    Platelet Count 318 150 - 450 10e3/uL    % Neutrophils 81 %    % Lymphocytes 13 %    % Monocytes 6 %    % Eosinophils 0 %    % Basophils 0 %    % Immature Granulocytes 0 %    NRBCs per 100 WBC 0 <1 /100    Absolute Neutrophils 13.3 (H) 1.6 -  8.3 10e3/uL    Absolute Lymphocytes 2.1 0.8 - 5.3 10e3/uL    Absolute Monocytes 1.0 0.0 - 1.3 10e3/uL    Absolute Eosinophils 0.0 0.0 - 0.7 10e3/uL    Absolute Basophils 0.1 0.0 - 0.2 10e3/uL    Absolute Immature Granulocytes 0.1 <=0.4 10e3/uL    Absolute NRBCs 0.0 10e3/uL   Lactic acid whole blood with 1x repeat in 2 hr when >2   Result Value Ref Range    Lactic Acid, Initial 2.9 (H) 0.7 - 2.0 mmol/L   [place] [facility]    Narrative    The following orders were created for panel order [place] [facility].  Procedure                               Abnormality         Status                     ---------                               -----------         ------                     Extra Blue Top Tube[[id number]]                              Final result               Extra Red Top Tube[[id number]]                               Final result               Extra Green Top (Lithium...[[id number]]                                                 Extra Purple Top Tube[[id number]]                                                       Extra Green Top (Lithium...[[id number]]                                                   Please view results for these tests on the individual orders.   Extra Blue Top Tube   Result Value Ref Range    Hold Specimen JIC    Extra Red Top Tube   Result Value Ref Range    Hold Specimen JIC    CT Abdomen Pelvis w Contrast    Narrative    CT ABDOMEN PELVIS W CONTRAST    CLINICAL HISTORY: Female, age 63 years,  sudden onset abdominal pain,  lower but generalized, elevated WBC;    Comparison:  No relevant prior imaging.    TECHNIQUE:  CT scanwas performed of the abdomen and pelvis with IV  contrast. Axial, sagittal and coronal images were reviewed. If  present, MIP and/or 3-D images were constructed by the technologist.    FINDINGS:  The lung bases and visualized portions of the heart are unremarkable.    Stomach and duodenum: Unremarkable.    Liver: Unremarkable.    Gallbladder: Unremarkable.    Spleen:  Unremarkable.    Pancreas: Unremarkable.    Adrenal glands: Unremarkable.    Kidneys: No acute abnormality. 2.5 cm cortical cyst in the lower pole  the left kidney. There are other smaller low dense lesions seen in the  upper pole the left and lower pole the right kidney that are too small  to characterize however likely benign, most likely representing small  cortical cysts.    Ureters: Unremarkable.    Urinary bladder: Unremarkable.    Large and small bowel: Diverticulosis of the colon. No apparent  diverticulitis.    Appendix: The appendix is inflamed with periappendiceal fat stranding  and appendicolith in the neck of the appendix. No distinct evidence of  perforation.    The uterus and ovaries are unremarkable.    The abdominal aorta and inferior vena cava are normal in contour with  scattered atherosclerotic calcifications of the abdominal aorta.  Retroperitoneal and mesenteric lymph nodes are normal in size.    Bony structures: No acute abnormality. Degenerative changes of the  spine and bony pelvis.      Impression    IMPRESSION:   Acute appendicitis. No evidence of perforation or abscess.    Results were discussed with Dr. Jerry at 0807 hours 5/15/2024     Additional nonacute findings as described above.      This facility minimizes radiation dose by adjusting the mA and/or kV  according to each patient size.      This CT scan was performed using one or more the following dose  reduction techniques:    -Automated exposure control,  -Adjustment of the mA and/or kV according to patient's size, and/or,  -Use of iterative reconstruction technique.    CAPRICE VAZQUEZ MD         SYSTEM ID:  [id number]   UA with Microscopic reflex to Culture    Specimen: Urine, Midstream   Result Value Ref Range    Color Urine Light Yellow Colorless, Straw, Light Yellow, Yellow    Appearance Urine Clear Clear    Glucose Urine Negative Negative mg/dL    Bilirubin Urine Negative Negative    Ketones Urine Trace (A) Negative mg/dL     Specific Gravity Urine 1.040 (H) 1.000 - 1.030    Blood Urine Negative Negative    pH Urine 7.0 5.0 - 9.0    Protein Albumin Urine Negative Negative mg/dL    Urobilinogen Urine Normal Normal, 2.0 mg/dL    Nitrite Urine Negative Negative    Leukocyte Esterase Urine Negative Negative    Mucus Urine Present (A) None Seen /LPF    RBC Urine 2 <=2 /HPF    WBC Urine <1 <=5 /HPF    Narrative    Urine Culture not indicated       Medications   ondansetron (ZOFRAN) injection 4 mg (has no administration in time range)   sodium chloride 0.9% BOLUS 1,000 mL (1,000 mLs Intravenous $New Bag 5/15/24 0801)   ketorolac (TORADOL) injection 30 mg (has no administration in time range)   HYDROmorphone (PF) (DILAUDID) injection 0.5 mg (has no administration in time range)   HYDROmorphone (PF) (DILAUDID) injection 0.5 mg (0.5 mg Intravenous $Given 5/15/24 0655)   ondansetron (ZOFRAN) injection 4 mg (4 mg Intravenous $Given 5/15/24 0655)   iopamidol (ISOVUE-370) solution 81 mL (81 mLs Intravenous $Given 5/15/24 0750)       Assessments & Plan (with Medical Decision Making)     I have reviewed the nursing notes.    I have reviewed the findings, diagnosis, plan and need for follow up with the patient.           Medical Decision Making  The patient's presentation was of moderate complexity (an undiagnosed new problem with uncertain diagnosis).    The patient's evaluation involved:  ordering and/or review of 3+ test(s) in this encounter (see separate area of note for details)    The patient's management necessitated high risk (a parenteral controlled substance).        New Prescriptions    No medications on file       Final diagnoses:   Acute appendicitis with localized peritonitis, without perforation, abscess, or gangrene       5/15/2024   [facility]       Vikki Jerry MD  05/15/24 0512

## 2024-05-15 NOTE — ANESTHESIA PREPROCEDURE EVALUATION
"Anesthesia Pre-Procedure Evaluation    Patient: Opal Bridges   MRN: [id number] : 1960        Procedure : Procedure(s):  APPENDECTOMY, LAPAROSCOPIC          History reviewed. No pertinent past medical history.   History reviewed. No pertinent surgical history.   Allergies   Allergen Reactions    Codeine Nausea and Vomiting and Hives      Social History     Tobacco Use    Smoking status: Not on file    Smokeless tobacco: Not on file   Substance Use Topics    Alcohol use: Not on file      Wt Readings from Last 1 Encounters:   05/15/24 63.5 kg (140 lb)        Anesthesia Evaluation   Pt has had prior anesthetic. Type: General.    History of anesthetic complications   pt describes residual paralysis after tonsill surgery \"felt like I was drowning\".    ROS/MED HX  ENT/Pulmonary:       Neurologic: Comment: Hx of vertigo      Cardiovascular:  - neg cardiovascular ROS     METS/Exercise Tolerance: 4 - Raking leaves, gardening    Hematologic:  - neg hematologic  ROS     Musculoskeletal:  - neg musculoskeletal ROS     GI/Hepatic:     (+)         appendicitis,           Renal/Genitourinary:  - neg Renal ROS     Endo:  - neg endo ROS     Psychiatric/Substance Use:  - neg psychiatric ROS     Infectious Disease:       Malignancy:       Other:  - neg other ROS          Physical Exam    Airway        Mallampati: I   TM distance: > 3 FB   Neck ROM: full   Mouth opening: > 3 cm    Respiratory Devices and Support         Dental       (+) Completely normal teeth      Cardiovascular   cardiovascular exam normal          Pulmonary   pulmonary exam normal                OUTSIDE LABS:  CBC:   Lab Results   Component Value Date    WBC 16.5 (H) 05/15/2024    HGB 13.2 05/15/2024    HCT 39.6 05/15/2024     05/15/2024     BMP:   Lab Results   Component Value Date     05/15/2024    POTASSIUM 4.0 05/15/2024    CHLORIDE 102 05/15/2024    CO2 21 (L) 05/15/2024    BUN 24.5 (H) 05/15/2024    CR 0.61 05/15/2024     (H) " "05/15/2024     COAGS: No results found for: \"PTT\", \"INR\", \"FIBR\"  POC: No results found for: \"BGM\", \"HCG\", \"HCGS\"  HEPATIC:   Lab Results   Component Value Date    ALBUMIN 4.7 05/15/2024    PROTTOTAL 7.8 05/15/2024    ALT 29 05/15/2024    AST 19 05/15/2024    ALKPHOS 78 05/15/2024    BILITOTAL 0.3 05/15/2024     OTHER:   Lab Results   Component Value Date    LACT 2.9 (H) 05/15/2024    PATRICK 9.4 05/15/2024    LIPASE 50 05/15/2024       Anesthesia Plan    ASA Status:  2, emergent    NPO Status:  NPO Appropriate    Anesthesia Type: General.     - Airway: ETT              Consents    Anesthesia Plan(s) and associated risks, benefits, and realistic alternatives discussed. Questions answered and patient/representative(s) expressed understanding.     - Discussed: Risks, Benefits and Alternatives for BOTH SEDATION and the PROCEDURE were discussed     - Discussed with:  Patient            Postoperative Care    Pain management: IV analgesics, Multi-modal analgesia.   PONV prophylaxis: Ondansetron (or other 5HT-3), Dexamethasone or Solumedrol     Comments:               JANNY LOMBARDI CRNA    I have reviewed the pertinent notes and labs in the chart from the past 30 days and (re)examined the patient.  Any updates or changes from those notes are reflected in this note.              # Overweight: Estimated body mass index is 27.34 kg/m  as calculated from the following:    Height as of this encounter: 1.524 m (5').    Weight as of this encounter: 63.5 kg (140 lb).      "

## 2024-05-15 NOTE — OR NURSING
PACU Transfer Note    Opal Bridges was transferred to 3 via cart.  Equipment used for transport:  none.  Accompanied by:  RN  Prescriptions were: Erx    PACU Respiratory Event Documentation     1) Episodes of Apnea greater than or equal to 10 seconds: none    2) Bradypnea - less than 8 breaths per minute: no    3) Pain score on 0 to 10 scale: no    4) Pain-sedation mismatch (yes or no): no    5) Repeated 02 desaturation less than 90% (yes or no): 0    Anesthesia notified? (yes or no): no    Report to Jen    Any of the above events occuring repeatedly in separate 30 minute intervals may be considered recurrent PACU respiratory events.    Patient stable and meets phase 1 discharge criteria for transport from PACU.

## 2024-05-15 NOTE — H&P
I was requested to see this patient in consultation by Dr. Jerry for evaluation of right sided abdominal pain.     HPI:   The patient is 63 year old female with complaints of abdominal pain. The pain has been present since 3 am. It started in the right lower quadrant. When it is the worst it is 8-10/10. Laying still makes the pain worse. The pain medications and laying still makes the pain better. The patient denies fever. The patient denies has had nausea, and vomiting. No constipation and diarrhea. Patient has decreased appetite. No problems with urinating. Previous testing: CT and labs.    CONSULTATION ASSESSMENT AND PLAN/RECOMMENDATIONS:   Acute appendicitis  I explained to the patient the risks, benefits and alternatives to laparoscopic appendectomy for treating acute appendicitis with fecalith. We specifically discussed the risks of bleeding, infection, injury to abdominal organs, the need for larger incisions and the possible need for further surgery. The patient's questions were answered and the patient wished to proceed. Informed consent paperwork was completed.    REVIEW OF SYSTEMS  GENERAL: fevers or chills. Denies fatigue, recent weight loss.  HEENT: No sinus drainage. No changes with vision or hearing. No difficulty swallowing.   LYMPHATICS:  No swollen nodes in axilla, neck or groin.  CARDIOVASCULAR: Denies chest pain, palpitations and dyspnea on exertion.  PULMONARY: No shortness of breath or cough. No increase in sputum production.  GI: Denies melena, bright red blood in stools. No hematemesis. No constipation or diarrhea.  : No dysuria or hematuria.  SKIN: No recent rashes or ulcers.   HEMATOLOGY:  No history of easy bruising or bleeding.  ENDOCRINE:  No history of diabetes or thyroid problems.  NEUROLOGY:  No history of seizures or headaches. No motor or sensory changes.  PAST MEDICAL HISTORY  History reviewed. No pertinent past medical history.  PAST SURGICAL HISTORY    Past Surgical History:    Procedure Laterality Date     N/A       CURRENT MEDS    Current Facility-Administered Medications   Medication Dose Route Frequency Provider Last Rate Last Admin    acetaminophen (TYLENOL) tablet 975 mg  975 mg Oral Once Zahida Catalan MD        dexAMETHasone (DECADRON) injection 4 mg  4 mg Intravenous Once PRN Tresa Miller APRN CRNA        fentaNYL (PF) (SUBLIMAZE) injection 25 mcg  25 mcg Intravenous Q5 Min PRN Tresa Miller APRN CRNA        fentaNYL (PF) (SUBLIMAZE) injection 50 mcg  50 mcg Intravenous Q5 Min PRN Tresa Miller APRN CRNA        HYDROmorphone (DILAUDID) injection 0.2 mg  0.2 mg Intravenous Q5 Min PRN Tresa Miller APRN CRNA        HYDROmorphone (DILAUDID) injection 0.4 mg  0.4 mg Intravenous Q5 Min PRN Tresa Miller APRN CRNA        [Auto Hold] HYDROmorphone (PF) (DILAUDID) injection 0.5 mg  0.5 mg Intravenous Q30 Min PRN Vikki Jerry MD   0.5 mg at 05/15/24 0844    ketorolac (TORADOL) injection 30 mg  30 mg Intravenous Q6H PRN Zahida Catalan MD        lactated ringers infusion   Intravenous Continuous Tresa Miller APRN CRNA        lactated ringers infusion   Intravenous Continuous Tresa Miller APRN CRNA 100 mL/hr at 05/15/24 0958 New Bag at 05/15/24 0958    lidocaine (LMX4) cream   Topical Q1H PRN Tresa Miller APRN CRNA        lidocaine 1 % 0.1-1 mL  0.1-1 mL Other Q1H PRN Tresa Miller APRN CRNA        naloxone (NARCAN) injection 0.1 mg  0.1 mg Intravenous Q2 Min PRN Tresa Miller APRN CRNA        ondansetron (ZOFRAN ODT) ODT tab 4 mg  4 mg Oral Q30 Min PRN Tresa Miller APRN CRNA        Or    ondansetron (ZOFRAN) injection 4 mg  4 mg Intravenous Q30 Min PRN Tresa Miller APRN CRNA        [Auto Hold] ondansetron (ZOFRAN) injection 4 mg  4 mg Intravenous Q30 Min PRN Marline Santana,         piperacillin-tazobactam (ZOSYN) 3.375 g vial to attach to  mL bag  3.375 g Intravenous Q6H Zahida Catalan MD   Stopped at 05/15/24 0912    prochlorperazine  (COMPAZINE) injection 5 mg  5 mg Intravenous Q6H PRN Tresa Miller APRN CRNA        scopolamine (TRANSDERM) 72 hr patch 1 patch  1 patch Transdermal Once Zahida Catalan MD        [Auto Hold] scopolamine (TRANSDERM-SCOP) Patch in Place   Transdermal Q8H Zahida Catalan MD        sodium chloride (PF) 0.9% PF flush 3 mL  3 mL Intracatheter Q8H Tresa Miller APRN CRNA        sodium chloride (PF) 0.9% PF flush 3 mL  3 mL Intracatheter q1 min prn Tresa Miller APRN CRNA         ALLERGIES/SENSITIVITIES    Allergies   Allergen Reactions    Codeine Nausea and Vomiting and Hives     FAMILY HISTORY  History reviewed. No pertinent family history.  SOCIAL HISTORY    Social History     Socioeconomic History    Marital status:        The above history was reviewed 5/15/2024.    PHYSICAL EXAM  Vitals: BP (!) 151/86   Pulse 66   Temp 97.2  F (36.2  C) (Temporal)   Resp 16   Ht 1.524 m (5')   Wt 63.5 kg (140 lb)   SpO2 100%   BMI 27.34 kg/m    GENERAL: patient in no acute distress. Pleasant and cooperative with exam and interview.   HEENT: Head-normocephalic. Eyes-no scleral icterus, pupils equal, round, and reactive to light. Nose-no nasal drainage. No lesions. Mouth-oral mucosa pink and moist, no lesions.  NECK: Supple. No thyroid nodules. Trachea midline.  LYMPHATICS:  No cervical, axillary or supraclavicular adenopathy.  CV: Regular rate and rhythm, no murmurs. No peripheral edema.  LUNGS:  No respiratory distress. Clear bilaterally to auscultation.  ABDOMEN: Non distended. Bowel sounds active. Soft, no hepatosplenomegaly or umbilical hernia. Tender RLQ with no diffuse peritoneal signs.  SKIN: Pink, warm and dry. No jaundice. No rash.  NEURO:  Cranial nerves II-XII grossly intact. Alert and oriented.  PSYCH: Appropriate mood and affect.    I reviewed the patient's recent CT images and labs. Pertinent findings: WBC elevated, CT finding of acute appendicitis with no sign of perforation.  Data   -Data reviewed  today: All pertinent laboratory and imaging results from this encounter were reviewed. I personally reviewed the abdominal CT image(s) showing changes of acute appendicitis .  Recent Labs   Lab 05/15/24  0649   WBC 16.5*   HGB 13.2   MCV 87         POTASSIUM 4.0   CHLORIDE 102   CO2 21*   BUN 24.5*   CR 0.61   ANIONGAP 15   PATRICK 9.4   *   ALBUMIN 4.7   PROTTOTAL 7.8   BILITOTAL 0.3   ALKPHOS 78   ALT 29   AST 19   LIPASE 50       Recent Results (from the past 24 hour(s))   CT Abdomen Pelvis w Contrast    Narrative    CT ABDOMEN PELVIS W CONTRAST    CLINICAL HISTORY: Female, age 63 years,  sudden onset abdominal pain,  lower but generalized, elevated WBC;    Comparison:  No relevant prior imaging.    TECHNIQUE:  CT scanwas performed of the abdomen and pelvis with IV  contrast. Axial, sagittal and coronal images were reviewed. If  present, MIP and/or 3-D images were constructed by the technologist.    FINDINGS:  The lung bases and visualized portions of the heart are unremarkable.    Stomach and duodenum: Unremarkable.    Liver: Unremarkable.    Gallbladder: Unremarkable.    Spleen: Unremarkable.    Pancreas: Unremarkable.    Adrenal glands: Unremarkable.    Kidneys: No acute abnormality. 2.5 cm cortical cyst in the lower pole  the left kidney. There are other smaller low dense lesions seen in the  upper pole the left and lower pole the right kidney that are too small  to characterize however likely benign, most likely representing small  cortical cysts.    Ureters: Unremarkable.    Urinary bladder: Unremarkable.    Large and small bowel: Diverticulosis of the colon. No apparent  diverticulitis.    Appendix: The appendix is inflamed with periappendiceal fat stranding  and appendicolith in the neck of the appendix. No distinct evidence of  perforation.    The uterus and ovaries are unremarkable.    The abdominal aorta and inferior vena cava are normal in contour with  scattered atherosclerotic  calcifications of the abdominal aorta.  Retroperitoneal and mesenteric lymph nodes are normal in size.    Bony structures: No acute abnormality. Degenerative changes of the  spine and bony pelvis.      Impression    IMPRESSION:   Acute appendicitis. No evidence of perforation or abscess.    Results were discussed with Dr. Jerry at 0807 hours 5/15/2024     Additional nonacute findings as described above.      This facility minimizes radiation dose by adjusting the mA and/or kV  according to each patient size.      This CT scan was performed using one or more the following dose  reduction techniques:    -Automated exposure control,  -Adjustment of the mA and/or kV according to patient's size, and/or,  -Use of iterative reconstruction technique.    CAPRICE VAZQUEZ MD         SYSTEM ID:  [id number]

## 2024-05-15 NOTE — PROGRESS NOTES
Pt last had solid food at supper time. At 0500 Pt had water, papaya tablets, and charcoal capsules.

## 2024-05-15 NOTE — ANESTHESIA PROCEDURE NOTES
Airway       Patient location during procedure: OR       Procedure Start/Stop Times: 5/15/2024 1:50 PM  Staff -        CRNA: Flip Jon APRN CRNA       Performed By: CRNA  Consent for Airway        Urgency: elective  Indications and Patient Condition       Indications for airway management: yahaira-procedural       Induction type:intravenous       Mask difficulty assessment: 1 - vent by mask    Final Airway Details       Final airway type: endotracheal airway       Successful airway: ETT - single  Endotracheal Airway Details        ETT size (mm): 7.0       Cuffed: yes       Cuff volume (mL): 5       Successful intubation technique: video laryngoscopy (First attempt with DL, Grade 3 view, transitioned to VL with grade 1 view)       VL Blade Size: Glidescope 3       Grade View of Cords: 1       Adjucts: stylet       Position: Center       Measured from: lips       Secured at (cm): 22       Bite block used: None    Post intubation assessment        Placement verified by: capnometry, equal breath sounds and chest rise        Number of attempts at approach: 1       Number of other approaches attempted: 0       Secured with: commercial tube oleary and tape       Ease of procedure: easy       Dentition: Intact and Unchanged    Medication(s) Administered   Medication Administration Time: 5/15/2024 1:50 PM

## 2024-05-15 NOTE — OP NOTE
Preoperative Diagnosis: Acute appendicitis     Postoperative Diagnosis: Acute appendicitis with localized peritonitis    Procedure planned:Laparoscopic appendectomy     Procedure performed: Laparoscopic appendectomy     Surgeon: Zahida Catalan MD   Circulator: Isabell Beck RN  Relief Circulator: Nathaly Avery RN  Scrub Person: Jacinta Dominguez    Anesthesia: General endotracheal with local    Specimen: appendix   Blood Loss: less than 10 ml     INDICATIONS   Please see H&P. The patient had acute onset of RLQ pain. WBC is elevated and CT scan showed changes consistent with acute appendicitis. The risks, benefits and alternatives to laparoscopic appendectomy for treating acute appendicitiis were discussed with the patient. We specifically discussed the risks of infection, bleeding, injury to abdominal organs and the possible need for an open procedure. The patient expressed understanding and questions were answered. Informed consent paperwork was completed.     DESCRIPTION OF PROCEDURE   The patient was brought to the operating room and placed in a supine position on the operating table. Appropriate monitors were attached. The patient received IV antibiotics preoperatively. After general anesthesia was induced, the patient was positioned, prepped and draped in the standard fashion. Time out was performed confirming the patient's identity and procedure to be performed.   Local anesthetic was infiltrated in the skin and subcutaneous tissue just above the umbilicus. The anterior abdominal wall was grasped with towel clips. A 5 mm incision was made. The Veress needle was inserted into the peritoneal cavity and placement confirmed using saline test. CO2 was then used to establish pneumoperitoneum. Trocar was inserted without difficulty. The camera was inserted, and the contents of the abdomen were inspected. No evidence of injury was noted on inspection. Local anesthetic was infiltrated in the mid lower abdomen  and the left lower abdomen. Skin incisions were made and under direct visualization trocars were positioned.The cecum was grasped and elevated. The appendix was identified and elevated. The base of the appendix was identified and grasped. Adhesions were taken down freeing the appendix to the tip. A window was created in the mesentery at the base of the appendix. The GI laparoscopic stapler was placed across the appendix and closed. The small bowel was inspected, no narrowing was noted. The stapler was fired. The staple line was inspected and there was excellent hemostasis. A second load was placed in the stapler and the staple positioned across the mesoappendix. The stapler was closed and fired. The staple line had adequate hemostasis. A clip was placed to achieve excellent hemostasis. The appendix was placed in the retrieval bag and removed from the abdomen through the left lower abdomen port. The staple lines were irrigated and the irrigation suctioned. Hemostasis was excellent. No evidence of leak was noted. The camera was repositioned, and the umbilical port site was inspected. No evidence of injury noted. The pneumoperitoneum was then reduced and trocars removed from the abdomen. Further local anesthetic was infiltrated for postop pain control. Deep tissues at the left lower abdomen were approximated using Vicryl suture. Skin edges were approximated using interrupted Monocryl suture. Sterile dressings were applied. The patient was then awakened from anesthesia and taken to postanesthesia recovery in stable condition. All needle, sponge and instrument counts were reported as correct at the conclusion of the case. The patient tolerated the procedure with no immediately apparent complications.

## 2024-05-15 NOTE — ANESTHESIA POSTPROCEDURE EVALUATION
Patient: Opal Bridges    Procedure: Procedure(s):  APPENDECTOMY, LAPAROSCOPIC       Anesthesia Type:  General    Note:  Disposition: Outpatient   Postop Pain Control: Uneventful            Sign Out: Well controlled pain   PONV: No   Neuro/Psych: Uneventful            Sign Out: Acceptable/Baseline neuro status   Airway/Respiratory: Uneventful            Sign Out: Acceptable/Baseline resp. status   CV/Hemodynamics: Uneventful            Sign Out: Acceptable CV status; No obvious hypovolemia; No obvious fluid overload   Other NRE: NONE   DID A NON-ROUTINE EVENT OCCUR?            Last vitals:  Vitals Value Taken Time   /78 05/15/24 1510   Temp 97.4  F (36.3  C) 05/15/24 1514   Pulse 93 05/15/24 1514   Resp 10 05/15/24 1514   SpO2 96 % 05/15/24 1514       Electronically Signed By: JANNY LOMBARDI CRNA  May 15, 2024  3:29 PM

## 2024-05-15 NOTE — ANESTHESIA CARE TRANSFER NOTE
Patient: Opal Bridges    Procedure: Procedure(s):  APPENDECTOMY, LAPAROSCOPIC       Diagnosis: Acute appendicitis with localized peritonitis, without perforation, abscess, or gangrene [K35.30]  Diagnosis Additional Information: No value filed.    Anesthesia Type:   General     Note:    Oropharynx: spontaneously breathing and oropharynx clear of all foreign objects  Level of Consciousness: awake  Oxygen Supplementation: room air    Independent Airway: airway patency satisfactory and stable  Dentition: dentition unchanged  Vital Signs Stable: post-procedure vital signs reviewed and stable  Report to RN Given: handoff report given  Patient transferred to: PACU    Handoff Report: Identifed the Patient, Identified the Reponsible Provider, Reviewed the pertinent medical history, Discussed the surgical course, Reviewed Intra-OP anesthesia mangement and issues during anesthesia, Set expectations for post-procedure period and Allowed opportunity for questions and acknowledgement of understanding      Vitals:  Vitals Value Taken Time   /91 05/15/24 1450   Temp     Pulse 101 05/15/24 1455   Resp 13 05/15/24 1455   SpO2 99 % 05/15/24 1455   Vitals shown include unfiled device data.    Electronically Signed By: JANNY Meredith CRNA  May 15, 2024  2:56 PM

## 2024-05-15 NOTE — ED TRIAGE NOTES
Pt is here today with her  for ABD pain that started at 0300.   The pain woke her up this ABD.  Pain is in her lower ABD.   It is constant but is variable from 7 to 10.   When the pain is 10, she vomits.   Denies any known fever.   Is also c/o of fatigue.   Pt did have 1 emesis while in triage.   /83   Pulse 75   Temp 97.2  F (36.2  C) (Temporal)   Resp 16   Ht 1.524 m (5')   Wt 63.5 kg (140 lb)   SpO2 99%   BMI 27.34 kg/m    Shelia Frey RN on 5/15/2024 at 6:38 AM       Triage Assessment (Adult)       Row Name 05/15/24 0636          Triage Assessment    Airway WDL WDL        Respiratory WDL    Respiratory WDL WDL        Skin Circulation/Temperature WDL    Skin Circulation/Temperature WDL WDL        Cardiac WDL    Cardiac WDL WDL        Peripheral/Neurovascular WDL    Peripheral Neurovascular WDL WDL        Cognitive/Neuro/Behavioral WDL    Cognitive/Neuro/Behavioral WDL WDL

## 2024-05-21 LAB
PATH REPORT.COMMENTS IMP SPEC: NORMAL
PATH REPORT.FINAL DX SPEC: NORMAL
PATH REPORT.RELEVANT HX SPEC: NORMAL
PHOTO IMAGE: NORMAL

## 2024-05-22 ENCOUNTER — NURSE TRIAGE (OUTPATIENT)
Dept: SURGERY | Facility: OTHER | Age: 64
End: 2024-05-22

## 2024-05-22 NOTE — TELEPHONE ENCOUNTER
S-(situation): Patient calls with postop issues s/p appendectomy DOS: 5/15/24     B-(background): Patient states that she has had nausea, diarrhea, and a rash on her back and arms for 3 days. Patient denies any antibiotic use except for intra-op. Denies any changes to food, laundry, or other possible dermatitis causing issues.    A-(assessment): Rash s/p surgery    R-(recommendations): Will route to provider for further recommendations.    Roma Loco RN on 5/22/2024 at 9:48 AM

## 2024-05-22 NOTE — TELEPHONE ENCOUNTER
Appointment made with Dr. TRISHA Catalan for 5/23/24 at 0920.  Roma Loco RN on 5/22/2024 at 10:12 AM

## 2024-05-30 ENCOUNTER — OFFICE VISIT (OUTPATIENT)
Dept: SURGERY | Facility: OTHER | Age: 64
End: 2024-05-30
Attending: SURGERY

## 2024-05-30 VITALS
RESPIRATION RATE: 16 BRPM | OXYGEN SATURATION: 98 % | DIASTOLIC BLOOD PRESSURE: 80 MMHG | WEIGHT: 142 LBS | TEMPERATURE: 97.7 F | SYSTOLIC BLOOD PRESSURE: 138 MMHG | HEART RATE: 80 BPM | BODY MASS INDEX: 27.73 KG/M2

## 2024-05-30 DIAGNOSIS — K35.30 ACUTE APPENDICITIS WITH LOCALIZED PERITONITIS, WITHOUT PERFORATION, ABSCESS, OR GANGRENE: ICD-10-CM

## 2024-05-30 DIAGNOSIS — Z09 FOLLOW-UP EXAMINATION AFTER GASTROINTESTINAL SURGERY: Primary | ICD-10-CM

## 2024-05-30 PROCEDURE — 99024 POSTOP FOLLOW-UP VISIT: CPT | Performed by: SURGERY

## 2024-05-30 ASSESSMENT — PAIN SCALES - GENERAL: PAINLEVEL: NO PAIN (0)

## 2024-05-30 NOTE — NURSING NOTE
Patient comes in for post op appy.    Chief Complaint   Patient presents with    Post-Op - General Surgery     Appy-5/15/24       Initial /80 (BP Location: Right arm, Patient Position: Sitting, Cuff Size: Adult Regular)   Pulse 80   Temp 97.7  F (36.5  C) (Temporal)   Resp 16   Wt 64.4 kg (142 lb)   SpO2 98%   BMI 27.73 kg/m   Estimated body mass index is 27.73 kg/m  as calculated from the following:    Height as of 5/15/24: 1.524 m (5').    Weight as of this encounter: 64.4 kg (142 lb).  Meds Reconciled: complete  PHQ and/or LUCAS reviewed. Pt referred to PCP/MH Provider as appropriate.    Antonia Abebe LPN

## 2024-05-30 NOTE — PATIENT INSTRUCTIONS
Return to normal activity.   Recommend probiotic daily for 2 more weeks for the gurgling. Trial of claritin or zyrtec daily for a week to see if that helps the throat irritation. If it doesn't, then trial pepcid daily for a week. If still having concerns, let us know!   Call for questions!

## 2024-05-30 NOTE — PROGRESS NOTES
Patient presents for post surgical visit after lap appy on 5/15/24. No problems with incisions. She had a rash on her arms and abdomen but that is gone. She is noting that her stomach is gurgling more than usual and that she feels like she has some throat irritation.   No abdominal pain. No diarrhea. No fevers. Energy level is back to normal. Normal appetite.    /80 (BP Location: Right arm, Patient Position: Sitting, Cuff Size: Adult Regular)   Pulse 80   Temp 97.7  F (36.5  C) (Temporal)   Resp 16   Wt 64.4 kg (142 lb)   SpO2 98%   BMI 27.73 kg/m    General: NAD, pleasant and cooperative with exam and interview.  Abdomen: healing incisions. No sign of infection. Appropriate pain with palpation.  Psychiatry: awake, alert and oriented. Appropriate affect.    Assessment/Plan:  Discussed surgery and pathology results. Recommend probiotic daily for 2 more weeks for the gurgling. Trial of claritin or zyrtec daily for a week to see if that helps the throat irritation. If it doesn't, then trial pepcid daily for a week. If still having concerns, she will let us know. Patient can return to normal activities. Patient will call with questions or concerns.

## 2024-07-13 ENCOUNTER — HEALTH MAINTENANCE LETTER (OUTPATIENT)
Age: 64
End: 2024-07-13

## 2024-07-26 ENCOUNTER — HOSPITAL ENCOUNTER (OUTPATIENT)
Dept: GENERAL RADIOLOGY | Facility: OTHER | Age: 64
Discharge: HOME | End: 2024-07-26

## 2024-07-26 ENCOUNTER — OFFICE VISIT (OUTPATIENT)
Dept: FAMILY MEDICINE | Facility: OTHER | Age: 64
End: 2024-07-26

## 2024-07-26 ENCOUNTER — MYC MEDICAL ADVICE (OUTPATIENT)
Dept: SURGERY | Facility: OTHER | Age: 64
End: 2024-07-26

## 2024-07-26 ENCOUNTER — TELEPHONE (OUTPATIENT)
Dept: FAMILY MEDICINE | Facility: OTHER | Age: 64
End: 2024-07-26

## 2024-07-26 VITALS
BODY MASS INDEX: 26.8 KG/M2 | OXYGEN SATURATION: 99 % | WEIGHT: 136.5 LBS | RESPIRATION RATE: 20 BRPM | DIASTOLIC BLOOD PRESSURE: 74 MMHG | TEMPERATURE: 98.7 F | SYSTOLIC BLOOD PRESSURE: 107 MMHG | HEIGHT: 60 IN | HEART RATE: 89 BPM

## 2024-07-26 DIAGNOSIS — R09.81 NASAL CONGESTION: ICD-10-CM

## 2024-07-26 DIAGNOSIS — R42 DIZZINESS: ICD-10-CM

## 2024-07-26 DIAGNOSIS — R52 GENERALIZED BODY ACHES: ICD-10-CM

## 2024-07-26 DIAGNOSIS — R50.9 FEVER, UNSPECIFIED FEVER CAUSE: ICD-10-CM

## 2024-07-26 DIAGNOSIS — R06.09 DYSPNEA ON EXERTION: ICD-10-CM

## 2024-07-26 DIAGNOSIS — J18.9 LINGULAR PNEUMONIA: Primary | ICD-10-CM

## 2024-07-26 DIAGNOSIS — R05.1 ACUTE COUGH: ICD-10-CM

## 2024-07-26 DIAGNOSIS — R09.89 CHEST CONGESTION: ICD-10-CM

## 2024-07-26 DIAGNOSIS — R53.83 FATIGUE, UNSPECIFIED TYPE: ICD-10-CM

## 2024-07-26 DIAGNOSIS — R19.7 DIARRHEA, UNSPECIFIED TYPE: ICD-10-CM

## 2024-07-26 DIAGNOSIS — R68.83 CHILLS: ICD-10-CM

## 2024-07-26 LAB
ANION GAP SERPL CALCULATED.3IONS-SCNC: 13 MMOL/L (ref 7–15)
BASOPHILS # BLD AUTO: 0 10E3/UL (ref 0–0.2)
BASOPHILS NFR BLD AUTO: 0 %
BUN SERPL-MCNC: 13.1 MG/DL (ref 8–23)
CALCIUM SERPL-MCNC: 9.9 MG/DL (ref 8.8–10.4)
CHLORIDE SERPL-SCNC: 97 MMOL/L (ref 98–107)
CREAT SERPL-MCNC: 0.62 MG/DL (ref 0.51–0.95)
EGFRCR SERPLBLD CKD-EPI 2021: >90 ML/MIN/1.73M2
EOSINOPHIL # BLD AUTO: 0.1 10E3/UL (ref 0–0.7)
EOSINOPHIL NFR BLD AUTO: 2 %
ERYTHROCYTE [DISTWIDTH] IN BLOOD BY AUTOMATED COUNT: 11.9 % (ref 10–15)
GLUCOSE SERPL-MCNC: 105 MG/DL (ref 70–99)
HCO3 SERPL-SCNC: 25 MMOL/L (ref 22–29)
HCT VFR BLD AUTO: 37.6 % (ref 35–47)
HGB BLD-MCNC: 12.3 G/DL (ref 11.7–15.7)
IMM GRANULOCYTES # BLD: 0 10E3/UL
IMM GRANULOCYTES NFR BLD: 0 %
LYMPHOCYTES # BLD AUTO: 1.6 10E3/UL (ref 0.8–5.3)
LYMPHOCYTES NFR BLD AUTO: 24 %
MCH RBC QN AUTO: 28.4 PG (ref 26.5–33)
MCHC RBC AUTO-ENTMCNC: 32.7 G/DL (ref 31.5–36.5)
MCV RBC AUTO: 87 FL (ref 78–100)
MONOCYTES # BLD AUTO: 0.5 10E3/UL (ref 0–1.3)
MONOCYTES NFR BLD AUTO: 8 %
NEUTROPHILS # BLD AUTO: 4.4 10E3/UL (ref 1.6–8.3)
NEUTROPHILS NFR BLD AUTO: 65 %
NRBC # BLD AUTO: 0 10E3/UL
NRBC BLD AUTO-RTO: 0 /100
PLATELET # BLD AUTO: 265 10E3/UL (ref 150–450)
POTASSIUM SERPL-SCNC: 4.7 MMOL/L (ref 3.4–5.3)
RBC # BLD AUTO: 4.33 10E6/UL (ref 3.8–5.2)
SODIUM SERPL-SCNC: 135 MMOL/L (ref 135–145)
WBC # BLD AUTO: 6.7 10E3/UL (ref 4–11)

## 2024-07-26 PROCEDURE — 71046 X-RAY EXAM CHEST 2 VIEWS: CPT

## 2024-07-26 PROCEDURE — 99204 OFFICE O/P NEW MOD 45 MIN: CPT

## 2024-07-26 PROCEDURE — 85025 COMPLETE CBC W/AUTO DIFF WBC: CPT | Mod: ZL

## 2024-07-26 PROCEDURE — 80048 BASIC METABOLIC PNL TOTAL CA: CPT | Mod: ZL

## 2024-07-26 PROCEDURE — 36415 COLL VENOUS BLD VENIPUNCTURE: CPT | Mod: ZL

## 2024-07-26 RX ORDER — AMOXICILLIN 500 MG/1
1000 CAPSULE ORAL 3 TIMES DAILY
Qty: 30 CAPSULE | Refills: 0 | Status: SHIPPED | OUTPATIENT
Start: 2024-07-26 | End: 2024-07-31

## 2024-07-26 RX ORDER — PREDNISONE 20 MG/1
TABLET ORAL
Qty: 20 TABLET | Refills: 0 | Status: SHIPPED | OUTPATIENT
Start: 2024-07-26

## 2024-07-26 RX ORDER — ALBUTEROL SULFATE 90 UG/1
2 AEROSOL, METERED RESPIRATORY (INHALATION) EVERY 4 HOURS PRN
Qty: 18 G | Refills: 1 | Status: SHIPPED | OUTPATIENT
Start: 2024-07-26

## 2024-07-26 ASSESSMENT — PAIN SCALES - GENERAL: PAINLEVEL: NO PAIN (0)

## 2024-07-26 NOTE — PROGRESS NOTES
ASSESSMENT/PLAN:    I have reviewed the nursing notes.  I have reviewed the findings, diagnosis, plan and need for follow up with the patient.    1. Fever, unspecified fever cause  2. Chills  3. Dyspnea on exertion  4. Acute cough  5. Chest congestion  6. Nasal congestion  7. Generalized body aches  8. Fatigue, unspecified type  9. Dizziness  10. Diarrhea, unspecified type    - XR Chest 2 Views- findings suggest lingular pneumonia.  Recommend follow-up to resolution per radiologist.    - CBC and Differential- unremarkable results    - Basic Metabolic Panel- chloride 97, glucose slightly elevated at 105.    11. Lingular pneumonia    - amoxicillin (AMOXIL) 500 MG capsule; Take 2 capsules (1,000 mg) by mouth 3 times daily for 5 days  Dispense: 30 capsule; Refill: 0     - predniSONE (DELTASONE) 20 MG tablet; Take 3 tabs by mouth daily x 3 days, then 2 tabs daily x 3 days, then 1 tab daily x 3 days, then 1/2 tab daily x 3 days.  Dispense: 20 tablet; Refill: 0    - albuterol (PROAIR HFA/PROVENTIL HFA/VENTOLIN HFA) 108 (90 Base) MCG/ACT inhaler; Inhale 2 puffs into the lungs every 4 hours as needed for shortness of breath, wheezing or cough  Dispense: 18 g; Refill: 1    - Please read the attached information on pneumonia for at home care treatment.    - Symptomatic treatment - Encouraged fluids, salt water gargles, honey (only if greater than 1 year in age due to risk of botulism), elevation, humidifier, sinus rinse/netti pot, lozenges, tea, topical vapor rub, popsicles, rest, etc     - May use over-the-counter Tylenol or ibuprofen as needed for pain, inflammation or fever    - Discussed warning signs/symptoms indicative of need to f/u    - Follow up if symptoms persist or worsen or concerns    - I explained my diagnostic considerations and recommendations to the patient, who voiced understanding and agreement with the treatment plan. All questions were answered. We discussed potential side effects of any prescribed or  recommended therapies, as well as expectations for response to treatments.    JANNY Barker CNP  2024  12:09 PM    HPI:    Opal Bridges is a 64 year old female who presents to [facility] today for concerns of possible pneumonia.  States that symptoms began with fever, congestion, body aches, and pain in left buttock.  Then became extreme fatigue, unable to take a deep breath, shortness of breath with exertion and talking.  Chills on and off.  Having difficulty talking full sentences.  Continues to feel dizzy and woozy.  Increased amount of mucus in stools.  States that she originally had diarrhea to now water consistency.  Been drinking hot water tea with honey, lemon and cayenne pepper, dayquil and nyquil without effectiveness.     History reviewed. No pertinent past medical history.  Past Surgical History:   Procedure Laterality Date     N/A     LAPAROSCOPIC APPENDECTOMY N/A 5/15/2024    Procedure: APPENDECTOMY, LAPAROSCOPIC;  Surgeon: Zahida Catalan MD;  Location:  OR     Social History     Tobacco Use    Smoking status: Not on file    Smokeless tobacco: Never    Tobacco comments:     Nicotine patches   Substance Use Topics    Alcohol use: Not Currently     Current Outpatient Medications   Medication Sig Dispense Refill    acetaminophen (TYLENOL) 325 MG tablet Take 2 tablets (650 mg) by mouth every 4 hours as needed for mild pain 50 tablet 0    ibuprofen (ADVIL/MOTRIN) 200 MG tablet Take 3 tablets (600 mg) by mouth every 6 hours as needed for other (mild and/or inflammatory pain)       Allergies   Allergen Reactions    Codeine Nausea and Vomiting and Hives     Past medical history, past surgical history, current medications and allergies reviewed and accurate to the best of my knowledge.      ROS:  Refer to HPI    /74 (BP Location: Left arm, Patient Position: Sitting, Cuff Size: Adult Regular)   Pulse 89   Temp 98.7  F (37.1  C) (Tympanic)   Resp 20   Ht 1.524 m (5')   Wt 61.9  kg (136 lb 8 oz)   SpO2 99%   Breastfeeding No   BMI 26.66 kg/m      EXAM:  General Appearance: Well appearing 64 year old female, appropriate appearance for age. No acute distress   Ears: Left TM intact, translucent with bony landmarks appreciated, mild erythema, + effusion, no bulging, no purulence.  Right TM intact, translucent with bony landmarks appreciated, + erythema, + effusion, no bulging, no purulence.  Left auditory canal clear.  Right auditory canal clear.  Normal external ears, non tender.  Eyes: conjunctivae normal without erythema or irritation, corneas clear, no drainage or crusting, no eyelid swelling, pupils equal   Oropharynx: moist mucous membranes, posterior pharynx with mild erythema, tonsils symmetric, mild erythema, no exudates or petechiae, no post nasal drip seen, no trismus, voice clear.    Sinuses:  No sinus tenderness upon palpation of the frontal or maxillary sinuses  Nose:  Bilateral nares: no erythema, no edema, no drainage, + congestion   Neck: supple without adenopathy  Respiratory: normal chest wall and respirations.  Normal effort.  Clear to auscultation bilaterally, no wheezing, crackles or rhonchi.  No increased work of breathing.  Harsh cough appreciated.  Cardiac: RRR with no murmurs  Musculoskeletal:  Equal movement of bilateral upper extremities.  Equal movement of bilateral lower extremities.  Normal gait.    Neuro: Alert and oriented to person, place, and time.    Psychological: normal affect, alert, oriented, and pleasant.     Labs: Xray:  Results for orders placed or performed in visit on 07/26/24   XR Chest 2 Views     Status: None    Narrative    PROCEDURE:  XR CHEST 2 VIEWS    HISTORY: Fever, unspecified fever cause; Acute cough; Fatigue,  unspecified type, .    COMPARISON:  None.    FINDINGS:  The cardiomediastinal contours are normal. The trachea is midline.  Patchy airspace opacity is present in the lingula. No effusion or  pneumothorax.    No suspicious osseous  lesion or subdiaphragmatic free air.      Impression    IMPRESSION:      Findings suggest lingular pneumonia. Recommend follow-up to  resolution.    CHERELLE RAMIRES MD         SYSTEM ID:  [id number]   Basic Metabolic Panel     Status: Abnormal   Result Value Ref Range    Sodium 135 135 - 145 mmol/L    Potassium 4.7 3.4 - 5.3 mmol/L    Chloride 97 (L) 98 - 107 mmol/L    Carbon Dioxide (CO2) 25 22 - 29 mmol/L    Anion Gap 13 7 - 15 mmol/L    Urea Nitrogen 13.1 8.0 - 23.0 mg/dL    Creatinine 0.62 0.51 - 0.95 mg/dL    GFR Estimate >90 >60 mL/min/1.73m2    Calcium 9.9 8.8 - 10.4 mg/dL    Glucose 105 (H) 70 - 99 mg/dL   CBC with platelets and differential     Status: None   Result Value Ref Range    WBC Count 6.7 4.0 - 11.0 10e3/uL    RBC Count 4.33 3.80 - 5.20 10e6/uL    Hemoglobin 12.3 11.7 - 15.7 g/dL    Hematocrit 37.6 35.0 - 47.0 %    MCV 87 78 - 100 fL    MCH 28.4 26.5 - 33.0 pg    MCHC 32.7 31.5 - 36.5 g/dL    RDW 11.9 10.0 - 15.0 %    Platelet Count 265 150 - 450 10e3/uL    % Neutrophils 65 %    % Lymphocytes 24 %    % Monocytes 8 %    % Eosinophils 2 %    % Basophils 0 %    % Immature Granulocytes 0 %    NRBCs per 100 WBC 0 <1 /100    Absolute Neutrophils 4.4 1.6 - 8.3 10e3/uL    Absolute Lymphocytes 1.6 0.8 - 5.3 10e3/uL    Absolute Monocytes 0.5 0.0 - 1.3 10e3/uL    Absolute Eosinophils 0.1 0.0 - 0.7 10e3/uL    Absolute Basophils 0.0 0.0 - 0.2 10e3/uL    Absolute Immature Granulocytes 0.0 <=0.4 10e3/uL    Absolute NRBCs 0.0 10e3/uL   CBC and Differential     Status: None    Narrative    The following orders were created for panel order CBC and Differential.  Procedure                               Abnormality         Status                     ---------                               -----------         ------                     CBC with platelets and d...[[id number]]                      Final result                 Please view results for these tests on the individual orders.

## 2024-07-26 NOTE — TELEPHONE ENCOUNTER
Please let patient know that yes it could be contagious if she were to cough in somebody's face it is spread through droplets.  Let her know to just be cautious cover her mouth when coughing or sneezing etc. and good handwashing.  She could even wear a mask when out in the public if she is concerned.

## 2024-07-26 NOTE — RESULT ENCOUNTER NOTE
Please let patient know that prescription for amoxicillin 1000 mg 3 times a day for 5 days has been sent to pharmacy to treat her for left upper lobe pneumonia.

## 2024-07-26 NOTE — NURSING NOTE
Chief Complaint   Patient presents with    Shortness of Breath     Patient presents in the clinic today with SOB.  She noticed symptoms of a cold on 7/18/24.  At that time she had a low fever, body aches, very dizzy, woozy and severe fatigue.  Also around that time she noted a pain in her left buttock.  She is drinking hot lemon water at home and not eating much food.    Initial /74 (BP Location: Left arm, Patient Position: Sitting, Cuff Size: Adult Regular)   Pulse 89   Temp 98.7  F (37.1  C) (Tympanic)   Resp 20   Ht 1.524 m (5')   Wt 61.9 kg (136 lb 8 oz)   SpO2 99%   Breastfeeding No   BMI 26.66 kg/m   Estimated body mass index is 26.66 kg/m  as calculated from the following:    Height as of this encounter: 1.524 m (5').    Weight as of this encounter: 61.9 kg (136 lb 8 oz).  Medication Review: complete    The next two questions are to help us understand your food security.  If you are feeling you need any assistance in this area, we have resources available to support you today.           No data to display                  Health Care Directive:  Patient does not have a Health Care Directive or Living Will: Discussed advance care planning with patient; however, patient declined at this time.    Gena Resendez

## 2024-07-26 NOTE — TELEPHONE ENCOUNTER
Pt states that she has pneumonia and she would like to know if she is contagious.  Please call.    Burt Mcneil on 7/26/2024 at 2:43 PM

## 2024-07-26 NOTE — TELEPHONE ENCOUNTER
Spoke with  staff, was informed their staff called patient with recommendations.  Roma Loco RN on 7/26/2024 at 4:09 PM

## 2024-07-29 NOTE — TELEPHONE ENCOUNTER
Patient has not seen Dr. Catalan since May when she had surgery. Patient has no PCP. Patient should establish with a provider if they do not have one at another facility as appears patient may be missing some of her preventative care needs. This may also be important so she has someone she can follow up with if her symptoms continue. [employer] message sent to patient. Jazmine Hendrickson RN  ....................  7/29/2024   10:12 AM

## 2024-08-02 ENCOUNTER — OFFICE VISIT (OUTPATIENT)
Dept: FAMILY MEDICINE | Facility: OTHER | Age: 64
End: 2024-08-02

## 2024-08-02 VITALS
HEIGHT: 60 IN | HEART RATE: 86 BPM | BODY MASS INDEX: 26.35 KG/M2 | RESPIRATION RATE: 16 BRPM | DIASTOLIC BLOOD PRESSURE: 74 MMHG | TEMPERATURE: 98.2 F | SYSTOLIC BLOOD PRESSURE: 110 MMHG | WEIGHT: 134.2 LBS | OXYGEN SATURATION: 99 %

## 2024-08-02 DIAGNOSIS — R05.1 ACUTE COUGH: ICD-10-CM

## 2024-08-02 DIAGNOSIS — R09.81 NASAL CONGESTION: ICD-10-CM

## 2024-08-02 DIAGNOSIS — R06.02 SHORTNESS OF BREATH: ICD-10-CM

## 2024-08-02 DIAGNOSIS — J06.9 UPPER RESPIRATORY TRACT INFECTION, UNSPECIFIED TYPE: Primary | ICD-10-CM

## 2024-08-02 DIAGNOSIS — J34.89 RHINORRHEA: ICD-10-CM

## 2024-08-02 DIAGNOSIS — R53.83 FATIGUE, UNSPECIFIED TYPE: ICD-10-CM

## 2024-08-02 DIAGNOSIS — H92.01 OTALGIA, RIGHT: ICD-10-CM

## 2024-08-02 DIAGNOSIS — R09.89 CHEST CONGESTION: ICD-10-CM

## 2024-08-02 DIAGNOSIS — R68.83 CHILLS (WITHOUT FEVER): ICD-10-CM

## 2024-08-02 DIAGNOSIS — Z87.01 HISTORY OF RECENT PNEUMONIA: ICD-10-CM

## 2024-08-02 LAB
BASOPHILS # BLD AUTO: 0.1 10E3/UL (ref 0–0.2)
BASOPHILS NFR BLD AUTO: 1 %
EOSINOPHIL # BLD AUTO: 0 10E3/UL (ref 0–0.7)
EOSINOPHIL NFR BLD AUTO: 0 %
ERYTHROCYTE [DISTWIDTH] IN BLOOD BY AUTOMATED COUNT: 12.5 % (ref 10–15)
HCT VFR BLD AUTO: 36.6 % (ref 35–47)
HGB BLD-MCNC: 12.3 G/DL (ref 11.7–15.7)
IMM GRANULOCYTES # BLD: 0.5 10E3/UL
IMM GRANULOCYTES NFR BLD: 4 %
LYMPHOCYTES # BLD AUTO: 2 10E3/UL (ref 0.8–5.3)
LYMPHOCYTES NFR BLD AUTO: 18 %
MCH RBC QN AUTO: 28.5 PG (ref 26.5–33)
MCHC RBC AUTO-ENTMCNC: 33.6 G/DL (ref 31.5–36.5)
MCV RBC AUTO: 85 FL (ref 78–100)
MONOCYTES # BLD AUTO: 0.6 10E3/UL (ref 0–1.3)
MONOCYTES NFR BLD AUTO: 6 %
NEUTROPHILS # BLD AUTO: 8.3 10E3/UL (ref 1.6–8.3)
NEUTROPHILS NFR BLD AUTO: 72 %
NRBC # BLD AUTO: 0 10E3/UL
NRBC BLD AUTO-RTO: 0 /100
PLATELET # BLD AUTO: 447 10E3/UL (ref 150–450)
RBC # BLD AUTO: 4.31 10E6/UL (ref 3.8–5.2)
WBC # BLD AUTO: 11.6 10E3/UL (ref 4–11)

## 2024-08-02 PROCEDURE — 36416 COLLJ CAPILLARY BLOOD SPEC: CPT | Mod: ZL

## 2024-08-02 PROCEDURE — 99213 OFFICE O/P EST LOW 20 MIN: CPT

## 2024-08-02 PROCEDURE — 85025 COMPLETE CBC W/AUTO DIFF WBC: CPT | Mod: ZL

## 2024-08-02 PROCEDURE — 87102 FUNGUS ISOLATION CULTURE: CPT | Mod: ZL

## 2024-08-02 ASSESSMENT — PAIN SCALES - GENERAL: PAINLEVEL: MILD PAIN (3)

## 2024-08-02 NOTE — PROGRESS NOTES
ASSESSMENT/PLAN:    I have reviewed the nursing notes.  I have reviewed the findings, diagnosis, plan and need for follow up with the patient.    1. Rhinorrhea  2. Nasal congestion  3. Otalgia, right  4. History of recent pneumonia  5. Fatigue, unspecified type  6. Chills (without fever)  7. Chest congestion  8. Shortness of breath  9. Acute cough    - CBC and Differential- slightly elevated WBC count of 11.6 otherwise unremarkable.    - Fungal or Yeast Culture Routine- results pending    10. Upper respiratory tract infection, unspecified type    - Continue to treat symptoms.  We will notify you with sputum culture results once received and discuss treatment plan at that time according to culture results.    - Symptomatic treatment - Encouraged fluids, salt water gargles, honey (only if greater than 1 year in age due to risk of botulism), elevation, humidifier, sinus rinse/netti pot, lozenges, tea, topical vapor rub, popsicles, rest, etc     - May use over-the-counter Tylenol or ibuprofen as needed for pain, inflammation or fever    - Discussed warning signs/symptoms indicative of need to f/u    - Follow up if symptoms persist or worsen or concerns    - I explained my diagnostic considerations and recommendations to the patient, who voiced understanding and agreement with the treatment plan. All questions were answered. We discussed potential side effects of any prescribed or recommended therapies, as well as expectations for response to treatments.    JANNY Barker CNP  8/2/2024  10:43 AM    HPI:    Opal Bridges is a 64 year old female who presents to [facility] today for concerns of possible pneumonia.  [facility]/26/2024 and was diagnosed treated for lingular pneumonia.  Patient states that on Monday and Tuesday she was feeling better but then took a turn for the worse again by Wednesday and is concerned that her pneumonia has not cleared.  Patient denies fever but  states that she still gets the chills and sweats on and off, chest pain, sore throat, headache, lightheadedness, dizziness, nausea, vomiting, diarrhea.    History reviewed. No pertinent past medical history.  Past Surgical History:   Procedure Laterality Date     N/A     LAPAROSCOPIC APPENDECTOMY N/A 5/15/2024    Procedure: APPENDECTOMY, LAPAROSCOPIC;  Surgeon: Zahida Catalan MD;  Location:  OR     Social History     Tobacco Use    Smoking status: Never    Smokeless tobacco: Never    Tobacco comments:     Nicotine patches   Substance Use Topics    Alcohol use: Not Currently     Current Outpatient Medications   Medication Sig Dispense Refill    acetaminophen (TYLENOL) 325 MG tablet Take 2 tablets (650 mg) by mouth every 4 hours as needed for mild pain 50 tablet 0    albuterol (PROAIR HFA/PROVENTIL HFA/VENTOLIN HFA) 108 (90 Base) MCG/ACT inhaler Inhale 2 puffs into the lungs every 4 hours as needed for shortness of breath, wheezing or cough 18 g 1    ibuprofen (ADVIL/MOTRIN) 200 MG tablet Take 3 tablets (600 mg) by mouth every 6 hours as needed for other (mild and/or inflammatory pain)      predniSONE (DELTASONE) 20 MG tablet Take 3 tabs by mouth daily x 3 days, then 2 tabs daily x 3 days, then 1 tab daily x 3 days, then 1/2 tab daily x 3 days. (Patient not taking: Reported on 2024) 20 tablet 0     Allergies   Allergen Reactions    Codeine Nausea and Vomiting and Hives     Past medical history, past surgical history, current medications and allergies reviewed and accurate to the best of my knowledge.      ROS:  Refer to HPI    /74 (BP Location: Right arm, Patient Position: Sitting, Cuff Size: Adult Regular)   Pulse 86   Temp 98.2  F (36.8  C) (Tympanic)   Resp 16   Ht 1.524 m (5')   Wt 60.9 kg (134 lb 3.2 oz)   SpO2 99%   BMI 26.21 kg/m      EXAM:  General Appearance: Well appearing 64 year old female, appropriate appearance for age. No acute distress   Ears: Left TM intact, translucent with  bony landmarks appreciated, mild erythema, no effusion, no bulging, no purulence.  Right TM intact, translucent with bony landmarks appreciated, mild erythema, no effusion, no bulging, no purulence.  Left auditory canal clear.  Right auditory canal clear.  Normal external ears, non tender.  Eyes: conjunctivae normal without erythema or irritation, corneas clear, no drainage or crusting, no eyelid swelling, pupils equal   Oropharynx: moist mucous membranes, posterior pharynx without erythema, tonsils symmetric, no erythema, no exudates or petechiae, no post nasal drip seen, no trismus, voice clear.    Sinuses:  No sinus tenderness upon palpation of the frontal or maxillary sinuses  Nose:  Bilateral nares: no erythema, no edema, + drainage and + congestion   Neck: supple without adenopathy  Respiratory: normal chest wall and respirations.  Normal effort.  Clear to auscultation bilaterally, no wheezing, crackles or rhonchi.  No increased work of breathing.  Occasional cough appreciated.  Cardiac: RRR with no murmurs  Musculoskeletal:  Equal movement of bilateral upper extremities.  Equal movement of bilateral lower extremities.  Normal gait.    Neuro: Alert and oriented to person, place, and time.    Psychological: normal affect, alert, oriented, and pleasant.     Labs:   Results for orders placed or performed in visit on 08/02/24   CBC with platelets and differential     Status: Abnormal   Result Value Ref Range    WBC Count 11.6 (H) 4.0 - 11.0 10e3/uL    RBC Count 4.31 3.80 - 5.20 10e6/uL    Hemoglobin 12.3 11.7 - 15.7 g/dL    Hematocrit 36.6 35.0 - 47.0 %    MCV 85 78 - 100 fL    MCH 28.5 26.5 - 33.0 pg    MCHC 33.6 31.5 - 36.5 g/dL    RDW 12.5 10.0 - 15.0 %    Platelet Count 447 150 - 450 10e3/uL    % Neutrophils 72 %    % Lymphocytes 18 %    % Monocytes 6 %    % Eosinophils 0 %    % Basophils 1 %    % Immature Granulocytes 4 %    NRBCs per 100 WBC 0 <1 /100    Absolute Neutrophils 8.3 1.6 - 8.3 10e3/uL    Absolute  Lymphocytes 2.0 0.8 - 5.3 10e3/uL    Absolute Monocytes 0.6 0.0 - 1.3 10e3/uL    Absolute Eosinophils 0.0 0.0 - 0.7 10e3/uL    Absolute Basophils 0.1 0.0 - 0.2 10e3/uL    Absolute Immature Granulocytes 0.5 (H) <=0.4 10e3/uL    Absolute NRBCs 0.0 10e3/uL   CBC and Differential     Status: Abnormal    Narrative    The following orders were created for panel order CBC and Differential.  Procedure                               Abnormality         Status                     ---------                               -----------         ------                     CBC with platelets and d...[[id number]]  Abnormal            Final result                 Please view results for these tests on the individual orders.

## 2024-08-02 NOTE — NURSING NOTE
Chief Complaint   Patient presents with    Cough     Possible pneumonia ,chill and sweats       Initial /74 (BP Location: Right arm, Patient Position: Sitting, Cuff Size: Adult Regular)   Pulse 86   Temp 98.2  F (36.8  C) (Tympanic)   Resp 16   Ht 1.524 m (5')   Wt 60.9 kg (134 lb 3.2 oz)   SpO2 99%   BMI 26.21 kg/m   Estimated body mass index is 26.21 kg/m  as calculated from the following:    Height as of this encounter: 1.524 m (5').    Weight as of this encounter: 60.9 kg (134 lb 3.2 oz).  Medication Review: complete    The next two questions are to help us understand your food security.  If you are feeling you need any assistance in this area, we have resources available to support you today.          7/26/2024   SDOH- Food Insecurity   Within the past 12 months, did you worry that your food would run out before you got money to buy more? N   Within the past 12 months, did the food you bought just not last and you didn t have money to get more? N            Health Care Directive:  Patient does not have a Health Care Directive or Living Will: Discussed advance care planning with patient; information given to patient to review.    America Miller CNA

## 2024-08-05 DIAGNOSIS — Z87.01 HISTORY OF RECENT PNEUMONIA: ICD-10-CM

## 2024-08-05 DIAGNOSIS — R05.8 COUGH PRESENT FOR GREATER THAN 3 WEEKS: Primary | ICD-10-CM

## 2024-08-07 ENCOUNTER — E-VISIT (OUTPATIENT)
Dept: URGENT CARE | Facility: CLINIC | Age: 64
End: 2024-08-07

## 2024-08-07 DIAGNOSIS — R06.02 SOB (SHORTNESS OF BREATH): ICD-10-CM

## 2024-08-07 DIAGNOSIS — R06.02 SHORTNESS OF BREATH: Primary | ICD-10-CM

## 2024-08-07 PROCEDURE — 99207 PR NON-BILLABLE SERV PER CHARTING: CPT | Performed by: EMERGENCY MEDICINE

## 2024-08-07 NOTE — PATIENT INSTRUCTIONS
Dear Opal Bridges,    We are sorry you are not feeling well. Based on the responses you provided, it is recommended that you be seen in-person in urgent care so we can better evaluate your symptoms. Please click here to find the nearest urgent care location to you.   You will not be charged for this Visit. Thank you for trusting us with your care.    Abelino Elizondo MD

## 2024-08-30 LAB — BACTERIA SPT CULT: NO GROWTH

## 2025-01-31 ENCOUNTER — APPOINTMENT (OUTPATIENT)
Dept: CT IMAGING | Facility: OTHER | Age: 65
End: 2025-01-31

## 2025-01-31 ENCOUNTER — HOSPITAL ENCOUNTER (OUTPATIENT)
Facility: OTHER | Age: 65
Setting detail: OBSERVATION
Discharge: HOME | End: 2025-02-01
Admitting: INTERNAL MEDICINE

## 2025-01-31 ENCOUNTER — APPOINTMENT (OUTPATIENT)
Dept: GENERAL RADIOLOGY | Facility: OTHER | Age: 65
End: 2025-01-31

## 2025-01-31 DIAGNOSIS — R42 DIZZINESS: ICD-10-CM

## 2025-01-31 PROBLEM — J18.9 PNEUMONIA: Status: ACTIVE | Noted: 2024-07-19

## 2025-01-31 LAB
ANION GAP SERPL CALCULATED.3IONS-SCNC: 16 MMOL/L (ref 7–15)
APTT PPP: 27 SECONDS (ref 22–38)
BASOPHILS # BLD AUTO: 0.1 10E3/UL (ref 0–0.2)
BASOPHILS NFR BLD AUTO: 1 %
BUN SERPL-MCNC: 26.6 MG/DL (ref 8–23)
CALCIUM SERPL-MCNC: 9.8 MG/DL (ref 8.8–10.4)
CHLORIDE SERPL-SCNC: 100 MMOL/L (ref 98–107)
CREAT SERPL-MCNC: 0.66 MG/DL (ref 0.51–0.95)
D DIMER PPP FEU-MCNC: <=0.27 UG/ML FEU (ref 0–0.5)
EGFRCR SERPLBLD CKD-EPI 2021: >90 ML/MIN/1.73M2
EOSINOPHIL # BLD AUTO: 0 10E3/UL (ref 0–0.7)
EOSINOPHIL NFR BLD AUTO: 0 %
ERYTHROCYTE [DISTWIDTH] IN BLOOD BY AUTOMATED COUNT: 12.6 % (ref 10–15)
GLUCOSE SERPL-MCNC: 105 MG/DL (ref 70–99)
HCO3 SERPL-SCNC: 20 MMOL/L (ref 22–29)
HCT VFR BLD AUTO: 36.9 % (ref 35–47)
HGB BLD-MCNC: 12.5 G/DL (ref 11.7–15.7)
HOLD SPECIMEN: NORMAL
IMM GRANULOCYTES # BLD: 0.1 10E3/UL
IMM GRANULOCYTES NFR BLD: 1 %
INR PPP: 0.8 (ref 0.85–1.15)
LYMPHOCYTES # BLD AUTO: 3.2 10E3/UL (ref 0.8–5.3)
LYMPHOCYTES NFR BLD AUTO: 32 %
MCH RBC QN AUTO: 28.5 PG (ref 26.5–33)
MCHC RBC AUTO-ENTMCNC: 33.9 G/DL (ref 31.5–36.5)
MCV RBC AUTO: 84 FL (ref 78–100)
MONOCYTES # BLD AUTO: 0.8 10E3/UL (ref 0–1.3)
MONOCYTES NFR BLD AUTO: 8 %
NEUTROPHILS # BLD AUTO: 6 10E3/UL (ref 1.6–8.3)
NEUTROPHILS NFR BLD AUTO: 59 %
NRBC # BLD AUTO: 0 10E3/UL
NRBC BLD AUTO-RTO: 0 /100
PLATELET # BLD AUTO: 311 10E3/UL (ref 150–450)
POTASSIUM SERPL-SCNC: 3.8 MMOL/L (ref 3.4–5.3)
RBC # BLD AUTO: 4.38 10E6/UL (ref 3.8–5.2)
SODIUM SERPL-SCNC: 136 MMOL/L (ref 135–145)
TROPONIN T SERPL HS-MCNC: <6 NG/L
WBC # BLD AUTO: 10.2 10E3/UL (ref 4–11)

## 2025-01-31 PROCEDURE — 250N000009 HC RX 250

## 2025-01-31 PROCEDURE — 70496 CT ANGIOGRAPHY HEAD: CPT

## 2025-01-31 PROCEDURE — 71045 X-RAY EXAM CHEST 1 VIEW: CPT

## 2025-01-31 PROCEDURE — 99285 EMERGENCY DEPT VISIT HI MDM: CPT

## 2025-01-31 PROCEDURE — 85379 FIBRIN DEGRADATION QUANT: CPT

## 2025-01-31 PROCEDURE — 250N000013 HC RX MED GY IP 250 OP 250 PS 637

## 2025-01-31 PROCEDURE — 83036 HEMOGLOBIN GLYCOSYLATED A1C: CPT | Performed by: INTERNAL MEDICINE

## 2025-01-31 PROCEDURE — 93005 ELECTROCARDIOGRAM TRACING: CPT

## 2025-01-31 PROCEDURE — 99285 EMERGENCY DEPT VISIT HI MDM: CPT | Mod: 25

## 2025-01-31 PROCEDURE — 85730 THROMBOPLASTIN TIME PARTIAL: CPT

## 2025-01-31 PROCEDURE — 70450 CT HEAD/BRAIN W/O DYE: CPT | Mod: XU

## 2025-01-31 PROCEDURE — 80048 BASIC METABOLIC PNL TOTAL CA: CPT

## 2025-01-31 PROCEDURE — 250N000011 HC RX IP 250 OP 636

## 2025-01-31 PROCEDURE — 84484 ASSAY OF TROPONIN QUANT: CPT

## 2025-01-31 PROCEDURE — 85025 COMPLETE CBC W/AUTO DIFF WBC: CPT

## 2025-01-31 PROCEDURE — 36415 COLL VENOUS BLD VENIPUNCTURE: CPT

## 2025-01-31 PROCEDURE — 93010 ELECTROCARDIOGRAM REPORT: CPT | Performed by: INTERNAL MEDICINE

## 2025-01-31 PROCEDURE — 85610 PROTHROMBIN TIME: CPT

## 2025-01-31 RX ORDER — MECLIZINE HCL 12.5 MG 12.5 MG/1
25 TABLET ORAL ONCE
Status: COMPLETED | OUTPATIENT
Start: 2025-01-31 | End: 2025-01-31

## 2025-01-31 RX ORDER — IOPAMIDOL 755 MG/ML
73 INJECTION, SOLUTION INTRAVASCULAR ONCE
Status: COMPLETED | OUTPATIENT
Start: 2025-01-31 | End: 2025-01-31

## 2025-01-31 RX ADMIN — IOPAMIDOL 73 ML: 755 INJECTION, SOLUTION INTRAVENOUS at 23:21

## 2025-01-31 RX ADMIN — MECLIZINE 12.5 MG: 12.5 TABLET ORAL at 22:53

## 2025-01-31 RX ADMIN — SODIUM CHLORIDE 70 ML: 9 INJECTION, SOLUTION INTRAVENOUS at 23:19

## 2025-01-31 ASSESSMENT — ENCOUNTER SYMPTOMS
NAUSEA: 1
VOMITING: 1
DIZZINESS: 1

## 2025-01-31 ASSESSMENT — COLUMBIA-SUICIDE SEVERITY RATING SCALE - C-SSRS
2. HAVE YOU ACTUALLY HAD ANY THOUGHTS OF KILLING YOURSELF IN THE PAST MONTH?: NO
6. HAVE YOU EVER DONE ANYTHING, STARTED TO DO ANYTHING, OR PREPARED TO DO ANYTHING TO END YOUR LIFE?: NO
1. IN THE PAST MONTH, HAVE YOU WISHED YOU WERE DEAD OR WISHED YOU COULD GO TO SLEEP AND NOT WAKE UP?: NO

## 2025-01-31 ASSESSMENT — ACTIVITIES OF DAILY LIVING (ADL): ADLS_ACUITY_SCORE: 41

## 2025-02-01 ENCOUNTER — APPOINTMENT (OUTPATIENT)
Dept: MRI IMAGING | Facility: OTHER | Age: 65
End: 2025-02-01
Attending: INTERNAL MEDICINE

## 2025-02-01 VITALS
DIASTOLIC BLOOD PRESSURE: 68 MMHG | WEIGHT: 142.1 LBS | SYSTOLIC BLOOD PRESSURE: 120 MMHG | HEIGHT: 60 IN | BODY MASS INDEX: 27.9 KG/M2 | HEART RATE: 88 BPM | RESPIRATION RATE: 18 BRPM | OXYGEN SATURATION: 98 % | TEMPERATURE: 98 F

## 2025-02-01 PROBLEM — R42 DIZZINESS: Status: ACTIVE | Noted: 2025-02-01

## 2025-02-01 PROBLEM — J18.9 PNEUMONIA: Status: RESOLVED | Noted: 2024-07-19 | Resolved: 2025-02-01

## 2025-02-01 LAB
ANION GAP SERPL CALCULATED.3IONS-SCNC: 12 MMOL/L (ref 7–15)
ATRIAL RATE - MUSE: 74 BPM
BUN SERPL-MCNC: 18.1 MG/DL (ref 8–23)
CALCIUM SERPL-MCNC: 9.3 MG/DL (ref 8.8–10.4)
CHLORIDE SERPL-SCNC: 105 MMOL/L (ref 98–107)
CREAT SERPL-MCNC: 0.62 MG/DL (ref 0.51–0.95)
DIASTOLIC BLOOD PRESSURE - MUSE: NORMAL MMHG
EGFRCR SERPLBLD CKD-EPI 2021: >90 ML/MIN/1.73M2
ERYTHROCYTE [DISTWIDTH] IN BLOOD BY AUTOMATED COUNT: 12.8 % (ref 10–15)
EST. AVERAGE GLUCOSE BLD GHB EST-MCNC: 120 MG/DL
GLUCOSE SERPL-MCNC: 95 MG/DL (ref 70–99)
HBA1C MFR BLD: 5.8 %
HCO3 SERPL-SCNC: 22 MMOL/L (ref 22–29)
HCT VFR BLD AUTO: 34.9 % (ref 35–47)
HGB BLD-MCNC: 11.5 G/DL (ref 11.7–15.7)
INTERPRETATION ECG - MUSE: NORMAL
MCH RBC QN AUTO: 28.2 PG (ref 26.5–33)
MCHC RBC AUTO-ENTMCNC: 33 G/DL (ref 31.5–36.5)
MCV RBC AUTO: 86 FL (ref 78–100)
P AXIS - MUSE: 63 DEGREES
PLATELET # BLD AUTO: 266 10E3/UL (ref 150–450)
POTASSIUM SERPL-SCNC: 3.9 MMOL/L (ref 3.4–5.3)
PR INTERVAL - MUSE: 200 MS
QRS DURATION - MUSE: 78 MS
QT - MUSE: 408 MS
QTC - MUSE: 452 MS
R AXIS - MUSE: -10 DEGREES
RBC # BLD AUTO: 4.08 10E6/UL (ref 3.8–5.2)
SODIUM SERPL-SCNC: 139 MMOL/L (ref 135–145)
SYSTOLIC BLOOD PRESSURE - MUSE: NORMAL MMHG
T AXIS - MUSE: 43 DEGREES
VENTRICULAR RATE- MUSE: 74 BPM
WBC # BLD AUTO: 7 10E3/UL (ref 4–11)

## 2025-02-01 PROCEDURE — G0378 HOSPITAL OBSERVATION PER HR: HCPCS

## 2025-02-01 PROCEDURE — 85041 AUTOMATED RBC COUNT: CPT | Performed by: INTERNAL MEDICINE

## 2025-02-01 PROCEDURE — 255N000002 HC RX 255 OP 636: Performed by: INTERNAL MEDICINE

## 2025-02-01 PROCEDURE — 250N000011 HC RX IP 250 OP 636: Performed by: INTERNAL MEDICINE

## 2025-02-01 PROCEDURE — A9575 INJ GADOTERATE MEGLUMI 0.1ML: HCPCS | Performed by: INTERNAL MEDICINE

## 2025-02-01 PROCEDURE — 250N000013 HC RX MED GY IP 250 OP 250 PS 637: Performed by: INTERNAL MEDICINE

## 2025-02-01 PROCEDURE — 36415 COLL VENOUS BLD VENIPUNCTURE: CPT | Performed by: INTERNAL MEDICINE

## 2025-02-01 PROCEDURE — 80048 BASIC METABOLIC PNL TOTAL CA: CPT | Performed by: INTERNAL MEDICINE

## 2025-02-01 PROCEDURE — 99235 HOSP IP/OBS SAME DATE MOD 70: CPT | Mod: 95 | Performed by: INTERNAL MEDICINE

## 2025-02-01 PROCEDURE — 70553 MRI BRAIN STEM W/O & W/DYE: CPT

## 2025-02-01 RX ORDER — DOCUSATE SODIUM 100 MG/1
100 CAPSULE, LIQUID FILLED ORAL 2 TIMES DAILY
Status: DISCONTINUED | OUTPATIENT
Start: 2025-02-01 | End: 2025-02-01 | Stop reason: HOSPADM

## 2025-02-01 RX ORDER — MULTIVIT WITH MINERALS/LUTEIN
1000 TABLET ORAL DAILY
COMMUNITY

## 2025-02-01 RX ORDER — ACETAMINOPHEN 325 MG/1
650 TABLET ORAL EVERY 4 HOURS PRN
Status: DISCONTINUED | OUTPATIENT
Start: 2025-02-01 | End: 2025-02-01 | Stop reason: HOSPADM

## 2025-02-01 RX ORDER — ONDANSETRON 4 MG/1
4 TABLET, ORALLY DISINTEGRATING ORAL EVERY 6 HOURS PRN
Status: DISCONTINUED | OUTPATIENT
Start: 2025-02-01 | End: 2025-02-01 | Stop reason: HOSPADM

## 2025-02-01 RX ORDER — AMOXICILLIN 250 MG
2 CAPSULE ORAL 2 TIMES DAILY PRN
Status: DISCONTINUED | OUTPATIENT
Start: 2025-02-01 | End: 2025-02-01 | Stop reason: HOSPADM

## 2025-02-01 RX ORDER — ALBUTEROL SULFATE 0.83 MG/ML
2.5 SOLUTION RESPIRATORY (INHALATION) EVERY 4 HOURS PRN
Status: DISCONTINUED | OUTPATIENT
Start: 2025-02-01 | End: 2025-02-01

## 2025-02-01 RX ORDER — AMOXICILLIN 250 MG
1 CAPSULE ORAL 2 TIMES DAILY PRN
Status: DISCONTINUED | OUTPATIENT
Start: 2025-02-01 | End: 2025-02-01 | Stop reason: HOSPADM

## 2025-02-01 RX ORDER — ONDANSETRON 2 MG/ML
4 INJECTION INTRAMUSCULAR; INTRAVENOUS EVERY 6 HOURS PRN
Status: DISCONTINUED | OUTPATIENT
Start: 2025-02-01 | End: 2025-02-01 | Stop reason: HOSPADM

## 2025-02-01 RX ORDER — ERGOCALCIFEROL (VITAMIN D2) 10 MCG
1 TABLET ORAL DAILY
COMMUNITY

## 2025-02-01 RX ORDER — GADOTERATE MEGLUMINE 376.9 MG/ML
15 INJECTION INTRAVENOUS ONCE
Status: COMPLETED | OUTPATIENT
Start: 2025-02-01 | End: 2025-02-01

## 2025-02-01 RX ORDER — PROCHLORPERAZINE MALEATE 5 MG/1
10 TABLET ORAL EVERY 6 HOURS PRN
Status: DISCONTINUED | OUTPATIENT
Start: 2025-02-01 | End: 2025-02-01 | Stop reason: HOSPADM

## 2025-02-01 RX ADMIN — GADOTERATE MEGLUMINE 13 ML: 376.9 INJECTION INTRAVENOUS at 08:11

## 2025-02-01 ASSESSMENT — ACTIVITIES OF DAILY LIVING (ADL)
ADLS_ACUITY_SCORE: 24
ADLS_ACUITY_SCORE: 41
ADLS_ACUITY_SCORE: 24
ADLS_ACUITY_SCORE: 41
ADLS_ACUITY_SCORE: 24

## 2025-02-01 ASSESSMENT — VISUAL ACUITY: OU: 1

## 2025-02-01 NOTE — ED TRIAGE NOTES
Pt is here today with her  for chest tightness that started 1 hour ago.   Pt was lifting a car seat up off the ground.   She then developed vertigo, sweats, n/v and diarrhea.   Pt reports that resting did not decrease her 5/10 pain in her chest.   The pain radiates up into both of her shoulders.   Denies any cardiac hx.   Is not on BP medications.     BP (!) 177/92   Pulse 81   Temp 97.8  F (36.6  C) (Temporal)   Resp 18   Ht 1.524 m (5')   Wt 63.5 kg (140 lb)   SpO2 97%   BMI 27.34 kg/m    Shelia Frey RN on 1/31/2025 at 10:15 PM       Triage Assessment (Adult)       Row Name 01/31/25 4059          Triage Assessment    Airway WDL WDL        Respiratory WDL    Respiratory WDL WDL        Skin Circulation/Temperature WDL    Skin Circulation/Temperature WDL WDL        Cardiac WDL    Cardiac WDL X;chest pain  HTN        Chest Pain Assessment    Chest Pain Location midsternal     Chest Pain Radiation shoulder  Bilateral     Character tightness     Precipitating Factors activity

## 2025-02-01 NOTE — PROGRESS NOTES
[facility] DISCHARGE NOTE    Patient discharged to home at 2:37 PM via wheel chair. Accompanied by staff. Discharge instructions reviewed with patient, opportunity offered to ask questions. Prescriptions sent to patients preferred pharmacy. All belongings sent with patient.     Marga Dalal RN

## 2025-02-01 NOTE — ED NOTES
Pt ambulated 250 feet without difficulty. States she feels like her dizziness only affects her if she is twisting her head around.

## 2025-02-01 NOTE — PROGRESS NOTES
[facility] Neurology Note    MRI and CTA are unremarkable; no findings to explain her symptoms. Vertigo is likely peripheral in nature. No further stroke work up required. Stroke service will sign off.     Monique Deras NP

## 2025-02-01 NOTE — ED PROVIDER NOTES
History     Chief Complaint   Patient presents with    Chest Pain    Nausea    Dizziness     The history is provided by the patient and medical records.     Opal Bridges is a 64 year old female who presents to the ER today complaining of sudden onset of vertigo around 2100 tonight. She was feeling like she was going to tip over and was unsteady on her feet.  She became nauseated, and vomited. Felt sweaty. She was coming to the ER today for evaluation she started to experience left-sided chest pain described as tight that went up to her shoulder blades.  Patient presents still feeling vertigo and dizzy.  Rates her chest tightness at a 1 out of 10.  She denies shortness of breath, recent illness, headache or other neurological deficits..   She reports a past history of vertigo but reports that the symptoms are worse and changed compared to her previous.    Patient otherwise is healthy.  Denies any head injury or trauma    Allergies:  Allergies   Allergen Reactions    Codeine Nausea and Vomiting and Hives       Problem List:    Patient Active Problem List    Diagnosis Date Noted    Dizziness 2025     Priority: Medium    Pneumonia 2024     Priority: Medium     i have [facility] and am waiting for a referral to see a pulmonologist          Past Medical History:    No past medical history on file.    Past Surgical History:    Past Surgical History:   Procedure Laterality Date     N/A     LAPAROSCOPIC APPENDECTOMY N/A 5/15/2024    Procedure: APPENDECTOMY, LAPAROSCOPIC;  Surgeon: Zahida Catalan MD;  Location:  OR       Family History:    No family history on file.    Social History:  Marital Status:   [2]  Social History     Tobacco Use    Smoking status: Never    Smokeless tobacco: Never    Tobacco comments:     Nicotine patches   Vaping Use    Vaping status: Never Used   Substance Use Topics    Alcohol use: Not Currently    Drug use: Never        Medications:     acetaminophen (TYLENOL) 325 MG tablet  albuterol (PROAIR HFA/PROVENTIL HFA/VENTOLIN HFA) 108 (90 Base) MCG/ACT inhaler  ibuprofen (ADVIL/MOTRIN) 200 MG tablet  predniSONE (DELTASONE) 20 MG tablet          Review of Systems   Cardiovascular:  Positive for chest pain.   Gastrointestinal:  Positive for nausea and vomiting.   Neurological:  Positive for dizziness.   All other systems reviewed and are negative.  HPI    Physical Exam   BP: (!) 177/92  Pulse: 81  Temp: 97.8  F (36.6  C)  Resp: 18  Height: 152.4 cm (5')  Weight: 63.5 kg (140 lb)  SpO2: 97 %      Physical Exam  Vitals and nursing note reviewed.   Constitutional:       General: She is not in acute distress.     Appearance: She is not ill-appearing or toxic-appearing.   HENT:      Head: Normocephalic.   Eyes:      General: Vision grossly intact.      Extraocular Movements: Extraocular movements intact.      Right eye: No nystagmus.      Left eye: No nystagmus.      Pupils: Pupils are equal, round, and reactive to light.   Cardiovascular:      Rate and Rhythm: Normal rate and regular rhythm.      Heart sounds: Normal heart sounds.   Pulmonary:      Effort: Pulmonary effort is normal.      Breath sounds: Normal breath sounds.   Abdominal:      Palpations: Abdomen is soft.   Musculoskeletal:         General: Normal range of motion.      Cervical back: Normal range of motion and neck supple.      Right lower leg: No edema.      Left lower leg: No edema.   Skin:     General: Skin is warm.      Capillary Refill: Capillary refill takes less than 2 seconds.   Neurological:      General: No focal deficit present.      Mental Status: She is alert and oriented to person, place, and time.      GCS: GCS eye subscore is 4. GCS verbal subscore is 5. GCS motor subscore is 6.      Cranial Nerves: Cranial nerves 2-12 are intact.      Sensory: Sensation is intact.      Motor: Motor function is intact.      Coordination: Coordination is intact.         ED Course            EKG  Interpretation:      Interpreted by JANNY Aranda CNP  Time reviewed: 2229  Symptoms at time of EKG: chest pain   Rhythm: normal sinus   Rate: 74  Ectopy: none  Conduction: normal  ST Segments/ T Waves: No acute ischemic changes  Possible inferior infarct, age undetermined.   Comparison to prior: No old EKG available  Clinical Impression: as above  Pending  EKG over read by internal medicine                 Results for orders placed or performed during the hospital encounter of 01/31/25 (from the past 24 hours)   [place] Draw    Narrative    The following orders were created for panel order [place] [facility].  Procedure                               Abnormality         Status                     ---------                               -----------         ------                     Extra Blue Top Tube[[id number]]                              Final result               Extra Red Top Tube[[id number]]                               Final result               Extra Green Top (Lithium...[[id number]]                      Final result               Extra Purple Top Tube[[id number]]                            Final result               Extra Green Top (Lithium...[[id number]]                      Final result                 Please view results for these tests on the individual orders.   Extra Blue Top Tube   Result Value Ref Range    Hold Specimen JIC    Extra Red Top Tube   Result Value Ref Range    Hold Specimen JIC    Extra Green Top (Lithium Heparin) Tube   Result Value Ref Range    Hold Specimen JIC    Extra Purple Top Tube   Result Value Ref Range    Hold Specimen JIC    Extra Green Top (Lithium Heparin) ON ICE   Result Value Ref Range    Hold Specimen JIC    CBC with platelets differential    Narrative    The following orders were created for panel order CBC with platelets differential.  Procedure                               Abnormality         Status                     ---------                               -----------          ------                     CBC with platelets and d...[[id number]]                      Final result                 Please view results for these tests on the individual orders.   Basic metabolic panel   Result Value Ref Range    Sodium 136 135 - 145 mmol/L    Potassium 3.8 3.4 - 5.3 mmol/L    Chloride 100 98 - 107 mmol/L    Carbon Dioxide (CO2) 20 (L) 22 - 29 mmol/L    Anion Gap 16 (H) 7 - 15 mmol/L    Urea Nitrogen 26.6 (H) 8.0 - 23.0 mg/dL    Creatinine 0.66 0.51 - 0.95 mg/dL    GFR Estimate >90 >60 mL/min/1.73m2    Calcium 9.8 8.8 - 10.4 mg/dL    Glucose 105 (H) 70 - 99 mg/dL   Troponin T, High Sensitivity   Result Value Ref Range    Troponin T, High Sensitivity <6 <=14 ng/L   D dimer quantitative   Result Value Ref Range    D-Dimer Quantitative <=0.27 0.00 - 0.50 ug/mL FEU    Narrative    This D-dimer assay is intended for use in conjunction with a clinical pretest probability assessment model to exclude pulmonary embolism (PE) and deep venous thrombosis (DVT) in outpatients suspected of PE or DVT. The cut-off value is 0.50 ug/mL FEU.    For patients 50 years of age or older, the application of age-adjusted cut-off values for D-Dimer may increase the specificity without significant effect on sensitivity. The literature suggested calculation age adjusted cut-off in ug/L = age in years x 10 ug/L. The results in this laboratory are reported as ug/mL rather than ug/L. The calculation for age adjusted cut off in ug/mL= age in years x 0.01 ug/mL. For example, the cut off for a 76 year old male is 76 x 0.01 ug/mL = 0.76 ug/mL (760 ug/L).    M Skyler et al. Age adjusted D-dimer cut-off levels to rule out pulmonary embolism: The ADJUST-PE Study. SINDHU 2014;311:1734-1476.; ELIS Wood et al. Diagnostic accuracy of conventional or age adjusted D-dimer cutoff values in older patients with suspected venous thromboembolism. Systemic review and meta-analysis. BMJ 2013:346:f2492.   INR   Result Value Ref Range    INR  0.80 (L) 0.85 - 1.15   Partial thromboplastin time   Result Value Ref Range    aPTT 27 22 - 38 Seconds   CBC with platelets and differential   Result Value Ref Range    WBC Count 10.2 4.0 - 11.0 10e3/uL    RBC Count 4.38 3.80 - 5.20 10e6/uL    Hemoglobin 12.5 11.7 - 15.7 g/dL    Hematocrit 36.9 35.0 - 47.0 %    MCV 84 78 - 100 fL    MCH 28.5 26.5 - 33.0 pg    MCHC 33.9 31.5 - 36.5 g/dL    RDW 12.6 10.0 - 15.0 %    Platelet Count 311 150 - 450 10e3/uL    % Neutrophils 59 %    % Lymphocytes 32 %    % Monocytes 8 %    % Eosinophils 0 %    % Basophils 1 %    % Immature Granulocytes 1 %    NRBCs per 100 WBC 0 <1 /100    Absolute Neutrophils 6.0 1.6 - 8.3 10e3/uL    Absolute Lymphocytes 3.2 0.8 - 5.3 10e3/uL    Absolute Monocytes 0.8 0.0 - 1.3 10e3/uL    Absolute Eosinophils 0.0 0.0 - 0.7 10e3/uL    Absolute Basophils 0.1 0.0 - 0.2 10e3/uL    Absolute Immature Granulocytes 0.1 <=0.4 10e3/uL    Absolute NRBCs 0.0 10e3/uL   XR Chest Port 1 View    Narrative    EXAM: XR CHEST PORT 1 VIEW  LOCATION: [facility]  DATE: 1/31/2025    INDICATION: chest pain  COMPARISON: 7/26/2024      Impression    IMPRESSION:  AP projection accentuates heart size and pulmonary vasculature. EKG wires and leads projecting over the chest obscure some details. Heart size and pulmonary vascularity upper limits of normal. No focal lung infiltrates. Osseous structures   grossly intact. Visualized upper abdomen unremarkable.   CT Head w/o Contrast    Narrative    EXAM: CTA HEAD NECK W CONTRAST, CT HEAD W/O CONTRAST  LOCATION: [facility]  DATE: 1/31/2025    INDICATION: Acute neuro deficit, stroke TIA suspected  COMPARISON: MRI brain March 30, 2018.   CONTRAST: Isovue 370, 73 mL  TECHNIQUE: Head and neck CT angiogram with IV contrast. Noncontrast head CT followed by axial helical CT images of the head and neck vessels obtained during the arterial phase of intravenous contrast administration. Axial 2D  reconstructed images and   multiplanar 3D MIP reconstructed images of the head and neck vessels were performed by the technologist. Dose reduction techniques were used. All stenosis measurements made according to NASCET criteria unless otherwise specified.    FINDINGS:   NONCONTRAST HEAD CT:   INTRACRANIAL CONTENTS: No intracranial hemorrhage, extraaxial collection, or mass effect.  No CT evidence of acute infarct. Normal parenchymal attenuation. Moderate generalized volume loss. No hydrocephalus.     VISUALIZED ORBITS/SINUSES/MASTOIDS: No intraorbital abnormality. No paranasal sinus mucosal disease. No middle ear or mastoid effusion.    BONES/SOFT TISSUES: No acute abnormality.    HEAD CTA:  ANTERIOR CIRCULATION: Calcified atherosclerosis of the carotid bulbs. No stenosis/occlusion, aneurysm, or high flow vascular malformation. Fetal origin of both posterior cerebral arteries from the anterior circulation.    POSTERIOR CIRCULATION: No stenosis/occlusion, aneurysm, or high flow vascular malformation. Balanced vertebral arteries supply a normal basilar artery.     DURAL VENOUS SINUSES: Expected enhancement of the major dural venous sinuses.    NECK CTA:  RIGHT CAROTID: Mild atherosclerosis of the carotid bulb. No measurable stenosis or dissection.    LEFT CAROTID: Mild atherosclerosis of the carotid bulb. No measurable stenosis or dissection.    VERTEBRAL ARTERIES: Moderate stenosis of the left vertebral artery origin. No additional focal stenosis or dissection. Balanced vertebral arteries.    AORTIC ARCH: Classic aortic arch anatomy with no significant stenosis at the origin of the great vessels.    NONVASCULAR STRUCTURES: Unremarkable.      Impression    IMPRESSION:   HEAD CT:  1.  No acute intracranial process.    HEAD CTA:   1.  No significant stenosis, aneurysm, or high flow vascular malformation identified.  2.  Variant [place] of [place] anatomy as above.    NECK CTA:  1.  No hemodynamically significant stenosis  in the neck vessels.   2.  No evidence for dissection.   CTA Head Neck with Contrast    Narrative    EXAM: CTA HEAD NECK W CONTRAST, CT HEAD W/O CONTRAST  LOCATION: [facility]  DATE: 1/31/2025    INDICATION: Acute neuro deficit, stroke TIA suspected  COMPARISON: MRI brain March 30, 2018.   CONTRAST: Isovue 370, 73 mL  TECHNIQUE: Head and neck CT angiogram with IV contrast. Noncontrast head CT followed by axial helical CT images of the head and neck vessels obtained during the arterial phase of intravenous contrast administration. Axial 2D reconstructed images and   multiplanar 3D MIP reconstructed images of the head and neck vessels were performed by the technologist. Dose reduction techniques were used. All stenosis measurements made according to NASCET criteria unless otherwise specified.    FINDINGS:   NONCONTRAST HEAD CT:   INTRACRANIAL CONTENTS: No intracranial hemorrhage, extraaxial collection, or mass effect.  No CT evidence of acute infarct. Normal parenchymal attenuation. Moderate generalized volume loss. No hydrocephalus.     VISUALIZED ORBITS/SINUSES/MASTOIDS: No intraorbital abnormality. No paranasal sinus mucosal disease. No middle ear or mastoid effusion.    BONES/SOFT TISSUES: No acute abnormality.    HEAD CTA:  ANTERIOR CIRCULATION: Calcified atherosclerosis of the carotid bulbs. No stenosis/occlusion, aneurysm, or high flow vascular malformation. Fetal origin of both posterior cerebral arteries from the anterior circulation.    POSTERIOR CIRCULATION: No stenosis/occlusion, aneurysm, or high flow vascular malformation. Balanced vertebral arteries supply a normal basilar artery.     DURAL VENOUS SINUSES: Expected enhancement of the major dural venous sinuses.    NECK CTA:  RIGHT CAROTID: Mild atherosclerosis of the carotid bulb. No measurable stenosis or dissection.    LEFT CAROTID: Mild atherosclerosis of the carotid bulb. No measurable stenosis or dissection.    VERTEBRAL  ARTERIES: Moderate stenosis of the left vertebral artery origin. No additional focal stenosis or dissection. Balanced vertebral arteries.    AORTIC ARCH: Classic aortic arch anatomy with no significant stenosis at the origin of the great vessels.    NONVASCULAR STRUCTURES: Unremarkable.      Impression    IMPRESSION:   HEAD CT:  1.  No acute intracranial process.    HEAD CTA:   1.  No significant stenosis, aneurysm, or high flow vascular malformation identified.  2.  Variant [place] of [place] anatomy as above.    NECK CTA:  1.  No hemodynamically significant stenosis in the neck vessels.   2.  No evidence for dissection.       Medications   meclizine (ANTIVERT) tablet 25 mg (12.5 mg Oral $Given 1/31/25 4389)   iopamidol (ISOVUE-370) solution 73 mL (73 mLs Intravenous $Given 1/31/25 7387)   sodium chloride 0.9 % bag 500 mL for CT scan flush use (70 mLs As instructed $Given 1/31/25 2380)       Assessments & Plan (with Medical Decision Making)  Opal Bridges is a 64 year old female who presents to the ER today complaining of sudden onset of vertigo around 2100 tonight. She was feeling like she was going to tip over and was unsteady on her feet.  She became nauseated, and vomited. Felt sweaty. She was coming to the ER today for evaluation she started to experience left-sided chest pain described as tight that went up to her shoulder blades.  Patient presents still feeling vertigo and dizzy.  Rates her chest tightness at a 1 out of 10.  She denies shortness of breath, recent illness, headache or other neurological deficits..   She reports a past history of vertigo but reports that the symptoms are worse and changed compared to her previous.  Patient otherwise is healthy.  Denies any head injury or trauma  [employer] Stroke Scale  Exam Interval: Baseline   Score    Level of consciousness: (0)   Alert, keenly responsive    LOC questions: (0)   Answers both questions correctly    LOC commands: (0)    Performs both tasks correctly    Best gaze: (0)   Normal    Visual: (0)   No visual loss    Facial palsy: (0)   Normal symmetrical movements    Motor arm (left): (0)   No drift    Motor arm (right): (0)   No drift    Motor leg (left): (0)   No drift    Motor leg (right): (0)   No drift    Limb ataxia: (0)   Absent    Sensory: (0)   Normal- no sensory loss    Best language: (0)   Normal- no aphasia    Dysarthria: (0)   Normal    Extinction and inattention: (0)   No abnormality        Total Score:  0      Patient is nondistressed no focal neurological deficit upon arrival. Vertigo symptoms worsening with position changes.   Differential diagnosis includes but not limited to: ACS, aortic dissection,  PE, pneumothorax, tamponade, GERD, musculoskeletal, pericarditis, endocarditis, myocarditis, pneumonia; Central vs peripheral vertigo  Stroke considered for symptoms, and stroke neurology consulted.  CT head and CTA head: 1.  No acute intracranial process.HEAD CTA: No significant stenosis, aneurysm, or high flow vascular malformation identified.Variant [place] of [place] anatomy, NECK CTA:No hemodynamically significant stenosis in the neck vessels. No evidence for dissection.   Please see documentation by Dr. Dunne, he recommended MRI.  CBC: normal, BMP: ok, Troponin normal, D-Dimer negative  Chest xray: stable  Chest pain improved while in the ER.  Patient stable troponin negative D-dimer negative.   Reproducible with palpation to left-sided chest wall.  It does not radiate.  She has no significant past medical history.  We gave her meclizine with some relief of symptoms. Continued to complain of vague vertigo, nausea.   12:35 AM Patient was up ambulating in the hallway, felt slightly nauseated but was reporting improvement of vertigo symptoms.  Gait is steady and even.  Discussed with patient, , recommendations for MRI from stroke neurology. Recommended observation admission for MRI in morning. Patient is accepting  to plan. Consulted with Dr. Parker who kindly accepted patient for observation.      I have reviewed the nursing notes.    I have reviewed the findings, diagnosis, plan and need for follow up with the patient.    Medical Decision Making  The patient's presentation was of high complexity (vertigo, chest pain).    The patient's evaluation involved:  review of external note(s) from 1 sources (see separate area of note for details)  ordering and/or review of 3+ test(s) in this encounter (see separate area of note for details)  discussion of management or test interpretation with another health professional (see separate area of note for details)    The patient's management necessitated moderate risk (IV contrast administration) and high risk (a decision regarding hospitalization).      New Prescriptions    No medications on file       Final diagnoses:   Dizziness       1/31/2025   [facility]Pham, JANNY CNP  02/01/25 0114

## 2025-02-01 NOTE — PROVIDER NOTIFICATION
02/01/25 0200   Initial Information   Arrived From emergency department   Admission in Past 90 Days none   Patient Belongings remains with patient   Patient Belongings Remaining with Patient cell phone/electronics;clothing;jewelry;vision aids   Did you bring any home meds/supplements to the hospital?  No     [facility] will make every effort per our policy to help keep your items safe while in the hospital.  If you choose to keep any items at the bedside, we cannot be held responsible for any items that are lost or broken.      List items sent to safe: NA    I have reviewed my belongings list on admission and verify that it is correct.     Patient signature_______________________________  Date/Time_____________________    2nd Staff person if patient unable to sign __________________________  Date/Time ______________________      I have received all my belongings noted above at discharge.    Patient signature________________________________  Date/Time  __________________________

## 2025-02-01 NOTE — ED NOTES
"  [facility]    Stroke Telephone Note    I was called by Pham Núñez on 01/31/25 regarding patient Opal Bridges. The patient is a 64 year old female with no sig past medical history sudden onset N/V and vertigo 9 PM. Changes with position, no other deficits.     Vitals  BP: (!) 170/85   Pulse: 72   Resp: 18   Temp: 97.8  F (36.6  C)   Weight: 63.5 kg (140 lb)    Imaging Findings  CT head: pending   CTA head/neck: pending     Impression    Vertigo  N/V    Recommendations  CT head  CTA head and neck  MRI Brain     My recommendations are based on the information provided over the phone by Opal Bridges's in-person providers. They are not intended to replace the clinical judgment of her in-person providers. I was not requested to personally see or examine the patient at this time.     The Stroke Staff is Dr. Cruz.    Jluis Dunne MD  Vascular Neurology Fellow    To page me or covering stroke neurology team member, click here: [facility]  Choose \"On Call\" tab at top, then select \"NEUROLOGY/ALL SITES\" from middle drop-down box, press Enter, then look for \"stroke\" or \"telestroke\" for your site.   "

## 2025-02-01 NOTE — DISCHARGE SUMMARY
[facility]    Discharge Summary  Hospitalist    Date of Admission:  1/31/2025  Date of Discharge:  2/1/2025  Discharging Provider: Kevyn Leo MD  Date of Service (when I saw the patient): 02/01/25    Discharge Diagnoses   Active Problems:    Vertigo    Date Noted: 2/1/2025      History of Present Illness   From Dr. Parker' H&P:  Opal Bridges is a 64 year old female  with history of asthma, presents to the emergency room with an acute onset of vertigo that started around 9pm Friday.  Patient states that she has had vertigo in the past but nothing like this.  She states that the onset was sudden, and caused her to have a very volatile episode of vomiting simultaneously with diarrhea.  She states that she generally uses essential oils (frankincense, murmur, Sandalwood and Vanilla for her skin)  and has been using essential oils for at least a year.  She also had used oil of oregano (which is for general health, vitamin C D and turmeric.  She denied drinking any herbal teas, or taking any other OTC herbal medications.  She states that she does wear a nicotine patch that she cuts down to 1 mg a few times a week.  She denied that she had any ringing in her ears at the time of the vertigo.  She said that she became concerned because the vertigo was not resolving and decided to come to the emergency room.  She states that while en route to the hospital she did develop some anterior substernal chest pain that radiated to her shoulders and then to her back however after the 15-minute drive to the hospital the symptoms had resolved almost completely.  She denied any nausea or vomiting during the time of the chest pain but said that she did have some slight sweating.  She denied any other ill contacts with the same symptoms.   She denied any coughing or shortness of breath. Neurology was consulted in the ED and an MRI of the brain was recommended.     Hospital Course   Stable overnight,  resolution of symptoms. Offered PT, ENT, neurology consults and the patient declines. Recommend follow-up in primary care for blood pressure.      Kevyn Leo MD    Significant Results and Procedures   See below    Pending Results   These results will be followed up by na  Unresulted Labs Ordered in the Past 30 Days of this Admission       No orders found for last 31 day(s).            Code Status   Full Code       Primary Care Physician   Non-Provider [facility]    Physical Exam   Temp: 98  F (36.7  C) Temp src: Tympanic BP: 120/68 Pulse: 88   Resp: 18 SpO2: 98 % O2 Device: None (Room air)    Vitals:    01/31/25 2211 02/01/25 0155   Weight: 63.5 kg (140 lb) 64.5 kg (142 lb 1.6 oz)     Vital Signs with Ranges  Temp:  [97.4  F (36.3  C)-98  F (36.7  C)] 98  F (36.7  C)  Pulse:  [68-88] 88  Resp:  [18] 18  BP: (120-177)/(68-93) 120/68  SpO2:  [94 %-98 %] 98 %  I/O last 3 completed shifts:  In: 480 [P.O.:480]  Out: -     Gen: Alert, NAD.  HEENT: MMM  Neck: Supple  CV: RRR no m/r/g  Pulm: CTAB, no w/r/r. No increased work of breathing  Neuro: Grossly intact  Skin: No concerning lesions.  Psychiatric: Normal affect and insight. Does not appear anxious or depressed.      Discharge Disposition   Discharged to home  Condition at discharge: Stable    Consultations This Hospital Stay   NEUROLOGY IP STROKE CONSULT  NEUROLOGY IP CONSULT    Time Spent on this Encounter   I, Kevyn Leo MD, personally saw the patient today and spent less than or equal to 30 minutes discharging this patient.    Discharge Orders      Reason for your hospital stay    vertigo     Follow-up and recommended labs and tests     1. With primary care clinic within 14 days, recheck BP     Activity    Your activity upon discharge: activity as tolerated     Diet    Follow this diet upon discharge: Current Diet:Orders Placed This Encounter      Regular Diet Adult     Discharge Medications   Current Discharge Medication List        CONTINUE these  medications which have NOT CHANGED    Details   Turmeric 450 MG CAPS Take 1 capsule by mouth daily.      vitamin C (ASCORBIC ACID) 1000 MG TABS Take 1,000 mg by mouth daily.      Vitamin D, Cholecalciferol, 10 MCG (400 UNIT) TABS Take 1 tablet by mouth daily.           STOP taking these medications       acetaminophen (TYLENOL) 325 MG tablet Comments:   Reason for Stopping:         ibuprofen (ADVIL/MOTRIN) 200 MG tablet Comments:   Reason for Stopping:             Allergies   Allergies   Allergen Reactions    Codeine Nausea and Vomiting     Data   Most Recent 3 CBC's:  Recent Labs   Lab Test 02/01/25  0600 01/31/25 2227 08/02/24  1146   WBC 7.0 10.2 11.6*   HGB 11.5* 12.5 12.3   MCV 86 84 85    311 447      Most Recent 3 BMP's:  Recent Labs   Lab Test 02/01/25  0600 01/31/25 2227 07/26/24  1306    136 135   POTASSIUM 3.9 3.8 4.7   CHLORIDE 105 100 97*   CO2 22 20* 25   BUN 18.1 26.6* 13.1   CR 0.62 0.66 0.62   ANIONGAP 12 16* 13   PATRICK 9.3 9.8 9.9   GLC 95 105* 105*     Most Recent 2 LFT's:  Recent Labs   Lab Test 05/15/24  0649   AST 19   ALT 29   ALKPHOS 78   BILITOTAL 0.3     Most Recent INR's and Anticoagulation Dosing History:  Anticoagulation Dose History          Latest Ref Rng & Units 1/31/2025   Recent Dosing and Labs   INR 0.85 - 1.15 0.80      Most Recent 3 Troponin's:No lab results found.  Most Recent Cholesterol Panel:No lab results found.  Most Recent 6 Bacteria Isolates From Any Culture (See EPIC Reports for Culture Details):No lab results found.  Most Recent TSH, T4 and A1c Labs:  Recent Labs   Lab Test 01/31/25 2227   A1C 5.8*     Results for orders placed or performed during the hospital encounter of 01/31/25   XR Chest Port 1 View    Narrative    EXAM: XR CHEST PORT 1 VIEW  LOCATION: [facility]  DATE: 1/31/2025    INDICATION: chest pain  COMPARISON: 7/26/2024      Impression    IMPRESSION:  AP projection accentuates heart size and pulmonary vasculature. EKG  wires and leads projecting over the chest obscure some details. Heart size and pulmonary vascularity upper limits of normal. No focal lung infiltrates. Osseous structures   grossly intact. Visualized upper abdomen unremarkable.   CT Head w/o Contrast    Narrative    EXAM: CTA HEAD NECK W CONTRAST, CT HEAD W/O CONTRAST  LOCATION: [facility]  DATE: 1/31/2025    INDICATION: Acute neuro deficit, stroke TIA suspected  COMPARISON: MRI brain March 30, 2018.   CONTRAST: Isovue 370, 73 mL  TECHNIQUE: Head and neck CT angiogram with IV contrast. Noncontrast head CT followed by axial helical CT images of the head and neck vessels obtained during the arterial phase of intravenous contrast administration. Axial 2D reconstructed images and   multiplanar 3D MIP reconstructed images of the head and neck vessels were performed by the technologist. Dose reduction techniques were used. All stenosis measurements made according to NASCET criteria unless otherwise specified.    FINDINGS:   NONCONTRAST HEAD CT:   INTRACRANIAL CONTENTS: No intracranial hemorrhage, extraaxial collection, or mass effect.  No CT evidence of acute infarct. Normal parenchymal attenuation. Moderate generalized volume loss. No hydrocephalus.     VISUALIZED ORBITS/SINUSES/MASTOIDS: No intraorbital abnormality. No paranasal sinus mucosal disease. No middle ear or mastoid effusion.    BONES/SOFT TISSUES: No acute abnormality.    HEAD CTA:  ANTERIOR CIRCULATION: Calcified atherosclerosis of the carotid bulbs. No stenosis/occlusion, aneurysm, or high flow vascular malformation. Fetal origin of both posterior cerebral arteries from the anterior circulation.    POSTERIOR CIRCULATION: No stenosis/occlusion, aneurysm, or high flow vascular malformation. Balanced vertebral arteries supply a normal basilar artery.     DURAL VENOUS SINUSES: Expected enhancement of the major dural venous sinuses.    NECK CTA:  RIGHT CAROTID: Mild atherosclerosis of the  carotid bulb. No measurable stenosis or dissection.    LEFT CAROTID: Mild atherosclerosis of the carotid bulb. No measurable stenosis or dissection.    VERTEBRAL ARTERIES: Moderate stenosis of the left vertebral artery origin. No additional focal stenosis or dissection. Balanced vertebral arteries.    AORTIC ARCH: Classic aortic arch anatomy with no significant stenosis at the origin of the great vessels.    NONVASCULAR STRUCTURES: Unremarkable.      Impression    IMPRESSION:   HEAD CT:  1.  No acute intracranial process.    HEAD CTA:   1.  No significant stenosis, aneurysm, or high flow vascular malformation identified.  2.  Variant [place] of V[place]quez anatomy as above.    NECK CTA:  1.  No hemodynamically significant stenosis in the neck vessels.   2.  No evidence for dissection.   CTA Head Neck with Contrast    Narrative    EXAM: CTA HEAD NECK W CONTRAST, CT HEAD W/O CONTRAST  LOCATION: [facility]  DATE: 1/31/2025    INDICATION: Acute neuro deficit, stroke TIA suspected  COMPARISON: MRI brain March 30, 2018.   CONTRAST: Isovue 370, 73 mL  TECHNIQUE: Head and neck CT angiogram with IV contrast. Noncontrast head CT followed by axial helical CT images of the head and neck vessels obtained during the arterial phase of intravenous contrast administration. Axial 2D reconstructed images and   multiplanar 3D MIP reconstructed images of the head and neck vessels were performed by the technologist. Dose reduction techniques were used. All stenosis measurements made according to NASCET criteria unless otherwise specified.    FINDINGS:   NONCONTRAST HEAD CT:   INTRACRANIAL CONTENTS: No intracranial hemorrhage, extraaxial collection, or mass effect.  No CT evidence of acute infarct. Normal parenchymal attenuation. Moderate generalized volume loss. No hydrocephalus.     VISUALIZED ORBITS/SINUSES/MASTOIDS: No intraorbital abnormality. No paranasal sinus mucosal disease. No middle ear or mastoid  effusion.    BONES/SOFT TISSUES: No acute abnormality.    HEAD CTA:  ANTERIOR CIRCULATION: Calcified atherosclerosis of the carotid bulbs. No stenosis/occlusion, aneurysm, or high flow vascular malformation. Fetal origin of both posterior cerebral arteries from the anterior circulation.    POSTERIOR CIRCULATION: No stenosis/occlusion, aneurysm, or high flow vascular malformation. Balanced vertebral arteries supply a normal basilar artery.     DURAL VENOUS SINUSES: Expected enhancement of the major dural venous sinuses.    NECK CTA:  RIGHT CAROTID: Mild atherosclerosis of the carotid bulb. No measurable stenosis or dissection.    LEFT CAROTID: Mild atherosclerosis of the carotid bulb. No measurable stenosis or dissection.    VERTEBRAL ARTERIES: Moderate stenosis of the left vertebral artery origin. No additional focal stenosis or dissection. Balanced vertebral arteries.    AORTIC ARCH: Classic aortic arch anatomy with no significant stenosis at the origin of the great vessels.    NONVASCULAR STRUCTURES: Unremarkable.      Impression    IMPRESSION:   HEAD CT:  1.  No acute intracranial process.    HEAD CTA:   1.  No significant stenosis, aneurysm, or high flow vascular malformation identified.  2.  Variant [place] of Vazquez anatomy as above.    NECK CTA:  1.  No hemodynamically significant stenosis in the neck vessels.   2.  No evidence for dissection.   MR Brain w/o & w Contrast    Narrative    EXAM: MR BRAIN W/O and W CONTRAST  LOCATION: [facility]  DATE: 2/1/2025    INDICATION: vertigo  COMPARISON: Same day head CT and head/neck CTA.  CONTRAST: 13ml dotarem  TECHNIQUE: Routine multiplanar multisequence head MRI without and with intravenous contrast.    FINDINGS:  INTRACRANIAL CONTENTS: No restricted diffusion to suggest acute or subacute infarct. No mass, acute hemorrhage, or extra-axial fluid collections. No significant white matter disease for patient's age. No vasogenic edema. Mild  generalized cerebral   atrophy. No hydrocephalus. Normal position of the cerebellar tonsils. No pathologic contrast enhancement.    SELLA: No abnormality accounting for technique.    OSSEOUS STRUCTURES/SOFT TISSUES: Normal marrow signal. The major intracranial vascular flow voids are maintained.     ORBITS: No abnormality accounting for technique.     SINUSES/MASTOIDS: No paranasal sinus mucosal disease. No middle ear or mastoid effusion.       Impression    IMPRESSION:    1.  No acute intracranial abnormality. Specifically, no evidence of recent infarct.  2.  No intracranial mass lesion.  3.  Mild generalized cerebral atrophy.

## 2025-02-01 NOTE — PLAN OF CARE
Goal Outcome Evaluation:    Temp: 97.4  F (36.3  C) Temp src: Tympanic BP: (!) 161/92 Pulse: 71   Resp: 18 SpO2: 98 % O2 Device: None (Room air)

## 2025-02-01 NOTE — PROGRESS NOTES
SAFETY CHECKLIST  ID Bands and Risk clasps correct and in place (DNR, Fall risk, Allergy, Latex, Limb):  Yes  All Lines Reconciled and labeled correctly: Yes  Whiteboard updated:Yes  Environmental interventions: Yes  Verify Tele #:  JUAN Dalal RN on 2/1/2025 at 7:15 AM

## 2025-02-01 NOTE — PLAN OF CARE
Goal Outcome Evaluation:         Patient is alert and oriented. VSS. Afebrile. Denies pain. Denies dizziness this morning.  Denies N/V. Scheduled MRI, completed. Patient refused morning medications of famotidine and colace. Patient ready to be discharged.

## 2025-02-01 NOTE — PROGRESS NOTES
Preventing Falls in the Hospital (02:42)  Your health professional recommends that you watch this short online health video.  Find out why you're at risk for falling in the hospital and how to prevent falls.   Purpose: Understand what increases a person's risk of falling in the hospital and how to prevent falls.  Goal: Understand what increases a person's risk of falling in the hospital and how to prevent falls.    Watch: Scan the QR code or visit the link to view video       https://[email or web address]  Current as of: July 17, 2023  Content Version: 14.2 2024 [facility].   Care instructions adapted under license by your healthcare professional. If you have questions about a medical condition or this instruction, always ask your healthcare professional. [employer] disclaims any warranty or liability for your use of this information.  Preventing Falls in the Hospital    Lewis = 15  No PI doc'd

## 2025-02-01 NOTE — H&P
HOSPITALIST TELEMED ADMISSION H&P    Service Date : 2/1/2025  Dr. Dayana CACERES am located in [place]  Opal Bridges is located in [place] at [facility].      RN, Abelino, on med/surg unit is assisting me today with this patient.    chief complaint: Vertigo    History of Present Illness:  Opal Bridges is a 64 year old female  with history of asthma, presents to the emergency room with an acute onset of vertigo that started around 9pm Friday.  Patient states that she has had vertigo in the past but nothing like this.  She states that the onset was sudden, and caused her to have a very volatile episode of vomiting simultaneously with diarrhea.  She states that she generally uses essential oils (frankincense, murmur, Sandalwood and Vanilla for her skin)  and has been using essential oils for at least a year.  She also had used oil of oregano (which is for general health, vitamin C D and turmeric.  She denied drinking any herbal teas, or taking any other OTC herbal medications.  She states that she does wear a nicotine patch that she cuts down to 1 mg a few times a week.  She denied that she had any ringing in her ears at the time of the vertigo.  She said that she became concerned because the vertigo was not resolving and decided to come to the emergency room.  She states that while en route to the hospital she did develop some anterior substernal chest pain that radiated to her shoulders and then to her back however after the 15-minute drive to the hospital the symptoms had resolved almost completely.  She denied any nausea or vomiting during the time of the chest pain but said that she did have some slight sweating.  She denied any other ill contacts with the same symptoms.   She denied any coughing or shortness of breath. Neurology was consulted in the ED and an MRI of the brain was recommended.   .  Emergency Room Course - in the emergency room, serologies for CMP, CBC, Trop T x 2,   D-Dimer, aPTT,  INR    BUN = 26.6  CR = 0.66       EKG:  NSR without any acute ST-T wave changes     Radiological diagnostics included:     Narrative & Impression   EXAM: XR CHEST PORT 1 VIEW  LOCATION: [facility]  DATE: 1/31/2025     INDICATION: chest pain  COMPARISON: 7/26/2024                                                                      IMPRESSION:  AP projection accentuates heart size and pulmonary vasculature. EKG wires and leads projecting over the chest obscure some details. Heart size and pulmonary vascularity upper limits of normal. No focal lung infiltrates. Osseous structures   grossly intact. Visualized upper abdomen unremarkable.        Narrative & Impression   EXAM: CTA HEAD NECK W CONTRAST, CT HEAD W/O CONTRAST  LOCATION: [facility]  DATE: 1/31/2025     INDICATION: Acute neuro deficit, stroke TIA suspected  COMPARISON: MRI brain March 30, 2018.   CONTRAST: Isovue 370, 73 mL  TECHNIQUE: Head and neck CT angiogram with IV contrast. Noncontrast head CT followed by axial helical CT images of the head and neck vessels obtained during the arterial phase of intravenous contrast administration. Axial 2D reconstructed images and   multiplanar 3D MIP reconstructed images of the head and neck vessels were performed by the technologist. Dose reduction techniques were used. All stenosis measurements made according to NASCET criteria unless otherwise specified.     FINDINGS:   NONCONTRAST HEAD CT:   INTRACRANIAL CONTENTS: No intracranial hemorrhage, extraaxial collection, or mass effect.  No CT evidence of acute infarct. Normal parenchymal attenuation. Moderate generalized volume loss. No hydrocephalus.      VISUALIZED ORBITS/SINUSES/MASTOIDS: No intraorbital abnormality. No paranasal sinus mucosal disease. No middle ear or mastoid effusion.     BONES/SOFT TISSUES: No acute abnormality.     HEAD CTA:  ANTERIOR CIRCULATION: Calcified atherosclerosis of the carotid bulbs. No  stenosis/occlusion, aneurysm, or high flow vascular malformation. Fetal origin of both posterior cerebral arteries from the anterior circulation.     POSTERIOR CIRCULATION: No stenosis/occlusion, aneurysm, or high flow vascular malformation. Balanced vertebral arteries supply a normal basilar artery.      DURAL VENOUS SINUSES: Expected enhancement of the major dural venous sinuses.     NECK CTA:  RIGHT CAROTID: Mild atherosclerosis of the carotid bulb. No measurable stenosis or dissection.     LEFT CAROTID: Mild atherosclerosis of the carotid bulb. No measurable stenosis or dissection.     VERTEBRAL ARTERIES: Moderate stenosis of the left vertebral artery origin. No additional focal stenosis or dissection. Balanced vertebral arteries.     AORTIC ARCH: Classic aortic arch anatomy with no significant stenosis at the origin of the great vessels.     NONVASCULAR STRUCTURES: Unremarkable.                                                                      IMPRESSION:   HEAD CT:  1.  No acute intracranial process.     HEAD CTA:   1.  No significant stenosis, aneurysm, or high flow vascular malformation identified.  2.  Variant [place] of [place] anatomy as above.     NECK CTA:  1.  No hemodynamically significant stenosis in the neck vessels.   2.  No evidence for dissection.      Narrative & Impression   EXAM: CTA HEAD NECK W CONTRAST, CT HEAD W/O CONTRAST  LOCATION: [facility]  DATE: 1/31/2025     INDICATION: Acute neuro deficit, stroke TIA suspected  COMPARISON: MRI brain March 30, 2018.   CONTRAST: Isovue 370, 73 mL  TECHNIQUE: Head and neck CT angiogram with IV contrast. Noncontrast head CT followed by axial helical CT images of the head and neck vessels obtained during the arterial phase of intravenous contrast administration. Axial 2D reconstructed images and   multiplanar 3D MIP reconstructed images of the head and neck vessels were performed by the technologist. Dose reduction techniques were  used. All stenosis measurements made according to NASCET criteria unless otherwise specified.     FINDINGS:   NONCONTRAST HEAD CT:   INTRACRANIAL CONTENTS: No intracranial hemorrhage, extraaxial collection, or mass effect.  No CT evidence of acute infarct. Normal parenchymal attenuation. Moderate generalized volume loss. No hydrocephalus.      VISUALIZED ORBITS/SINUSES/MASTOIDS: No intraorbital abnormality. No paranasal sinus mucosal disease. No middle ear or mastoid effusion.     BONES/SOFT TISSUES: No acute abnormality.     HEAD CTA:  ANTERIOR CIRCULATION: Calcified atherosclerosis of the carotid bulbs. No stenosis/occlusion, aneurysm, or high flow vascular malformation. Fetal origin of both posterior cerebral arteries from the anterior circulation.     POSTERIOR CIRCULATION: No stenosis/occlusion, aneurysm, or high flow vascular malformation. Balanced vertebral arteries supply a normal basilar artery.      DURAL VENOUS SINUSES: Expected enhancement of the major dural venous sinuses.     NECK CTA:  RIGHT CAROTID: Mild atherosclerosis of the carotid bulb. No measurable stenosis or dissection.     LEFT CAROTID: Mild atherosclerosis of the carotid bulb. No measurable stenosis or dissection.     VERTEBRAL ARTERIES: Moderate stenosis of the left vertebral artery origin. No additional focal stenosis or dissection. Balanced vertebral arteries.     AORTIC ARCH: Classic aortic arch anatomy with no significant stenosis at the origin of the great vessels.     NONVASCULAR STRUCTURES: Unremarkable.                                                                      IMPRESSION:   HEAD CT:  1.  No acute intracranial process.     HEAD CTA:   1.  No significant stenosis, aneurysm, or high flow vascular malformation identified.  2.  Variant [place] of [place] anatomy as above.     NECK CTA:  1.  No hemodynamically significant stenosis in the neck vessels.   2.  No evidence for dissection.            Past Medical History  No past  medical history on file.   Patient Active Problem List   Diagnosis    Pneumonia    Dizziness         Past Surgical History  Past Surgical History:   Procedure Laterality Date     N/A     LAPAROSCOPIC APPENDECTOMY N/A 5/15/2024    Procedure: APPENDECTOMY, LAPAROSCOPIC;  Surgeon: Zahida Catalan MD;  Location:  OR      Social History  Opal  reports that she has never smoked. She has never used smokeless tobacco. She reports that she does not currently use alcohol. She reports that she does not use drugs.    Family History  Opal's family history is not on file.    Home Medications  Current Outpatient Medications   Medication Instructions    acetaminophen (TYLENOL) 650 mg, Oral, EVERY 4 HOURS PRN    albuterol (PROAIR HFA/PROVENTIL HFA/VENTOLIN HFA) 108 (90 Base) MCG/ACT inhaler 2 puffs, Inhalation, EVERY 4 HOURS PRN    ibuprofen (ADVIL/MOTRIN) 600 mg, Oral, EVERY 6 HOURS PRN    predniSONE (DELTASONE) 20 MG tablet Take 3 tabs by mouth daily x 3 days, then 2 tabs daily x 3 days, then 1 tab daily x 3 days, then 1/2 tab daily x 3 days.       Allergies  Allergies   Allergen Reactions    Codeine Nausea and Vomiting and Hives        10 pt ROS neg except as noted in HPI    Physical Exam:  I performed all aspects of the physical examination via Telemedicine associated with two way audio and video communication.  Blood pressure (!) 161/92, pulse 71, temperature 97.4  F (36.3  C), temperature source Tympanic, resp. rate 18, height 1.524 m (5'), weight 64.5 kg (142 lb 1.6 oz), SpO2 98%,   Vital Signs:     Physical Exam    Gen:  Well-developed, well-nourished, in no acute distress, lying semi-supine in her hospital bed  HEENT:  NC/AT, EOMI, hearing intact to voice  Resp:  No accessory muscle use, breath sounds clear; no wheezes no rales no rhonchi  Card:  No murmur, normal S1, S2   Abd:  Soft per RN exam, no TTP, non-distended, normoactive bowel sounds are present  Ext:  No clubbing, cyanosis or edema was noted  Skin:  Normal, no rashes or skin breakdown noted.   Psych:  Not anxious, not agitated    DATA:   I&O: No intake/output data recorded.          Labs:  I have personally reviewed the following studies:  Recent Labs   Lab 01/31/25  2227   POTASSIUM 3.8   CHLORIDE 100   CO2 20*   BUN 26.6*   PTT 27   INR 0.80*      Recent Labs   Lab 01/31/25  2227   WBC 10.2   HGB 12.5   HCT 36.9            Imaging: ER imaging reviewed        Misc  DVT prophylaxis:  Pneumatic compression device  Code Status: Full code   Cardiac Monitoring: Yes active telemetry  Romo:  No  Lines:   peripheral IV  Diet:  regular      ASSESSMENT/PLAN:     64-year-old with acute onset of vertigo and associated chest pain, will be placed in observation for further medical management and  to further delineate vertigo versus TIA versus new onset hypertension.     This patient's current admission to an acute care setting is essential for the treatment of Vertigo vs TIA vs new onset hypertension    The patient's recovery is dependent on the following treatments of   - MRI  - Neurology consult  - Consider starting anti- hypertensive if MRI is unremarkable  -Correcting any electrolyte abnormalities  to stabilize this condition.     The patient is at risk of developing further complications of end organ failures if not treated in an acute care setting.     I expect the patient's hospital stay to require  1 - 2 days    Our patient will be admitted under the hospitalist services and further management to be based on patient's clinical progress.       # ACTIVE PROBLEM LIST:     #Asthma  - Will monitor for symptoms of wheezing, short of breath, cough  - Duoneb tx prn      # Overweight:     Estimated body mass index is 27.75 kg/m  as calculated from the following:    Height as of this encounter: 1.524 m (5').    Weight as of this encounter: 64.5 kg (142 lb 1.6 oz).         Clinically Significant Risk Factors Present on Admission                          As the provider  for the telehealth service, I attest that I introduced myself to the patient and provided my credentials. Based on review of the patient s chart and/or a discussion with members of the patient s treatment team, I determined that telemedicine via a real-time, two-way, interactive audio and video platform is an appropriate means of providing this service.       The encounter was approximately 15 minutes. The nurse was present for the duration of the encounter.    Chart reviewed,labs and available imaging reviewed,consultant notes reviewed. Previous available medical records have been reviewed  Care plan discussed with care team    I have seen and examined the patient today. Documentation for this visit was completed using a template. Everything documented was personally performed today with the necessary additions, deletions and changes made as appropriate with the diagnoses being listed in no particular order of clinical relevance.    [facility]ana [facility]  Securely message with the Vocera Web Console (learn more here)  Text page via [facility] Paging/Directory

## 2025-02-01 NOTE — PHARMACY-ADMISSION MEDICATION HISTORY
Pharmacist Admission Medication History    Admission medication history is complete. The information provided in this note is only as accurate as the sources available at the time of the update.    Information Source(s): Patient and CareEverywhere/SureScripts via in-person    Pertinent Information:  -Patient attests to not using Rx medications- stays away from APAP/ibuprofen and uses largely natural supplements  -Albuterol inhalers was for use during pneumonia in Summer 2024- patient does not use at baseline    Changes made to PTA medication list:  Added: Turmeric, vitamin C, vitamin D  Deleted: Albuterol, prednisone, ibuprofen, APAP  Changed: None    Allergies reviewed with patient and updates made in EHR: yes    Medication History Completed By: Megan Mary RPH 2/1/2025 9:43 AM    PTA Med List   Medication Sig Last Dose/Taking    Turmeric 450 MG CAPS Take 1 capsule by mouth daily. 1/31/2025 Morning    vitamin C (ASCORBIC ACID) 1000 MG TABS Take 1,000 mg by mouth daily. 1/31/2025 Morning    Vitamin D, Cholecalciferol, 10 MCG (400 UNIT) TABS Take 1 tablet by mouth daily. 1/31/2025 Morning

## 2025-02-01 NOTE — PROGRESS NOTES
NSG Admission NOTE    Patient admitted to room 336 at approximately 0140 via wheel chair from emergency room. Patient was accompanied by transport tech.     Verbal SBAR report received from Bashir prior to patient arrival.     Patient ambulated to bed independently. Patient alert and oriented X 3. The patient is not having any pain.  . Admission vital signs: Blood pressure (!) 144/86, pulse 79, temperature 97.8  F (36.6  C), temperature source Temporal, resp. rate 18, height 1.524 m (5'), weight 63.5 kg (140 lb), SpO2 94%, not currently breastfeeding. Patient was oriented to plan of care, call light, bed controls, tv, telephone, bathroom, and visiting hours.     Risk Assessment    The following safety risks were identified during admission: fall. Yellow risk band applied: YES.     Skin Initial Assessment    This writer admitted this patient and completed a full skin assessment and Escobar score in the Adult PCS flowsheet.   Photo documentation of skin problem and/or wound competed via My [employer] Box application (located under Media):  N/A    Appropriate interventions initiated as needed.     Secondary skin check completed by Abelino.         Education    Patient has a [place] to Observation order: Yes  Observation education completed and documented: Yes      Abelino Lewis RN

## 2025-02-01 NOTE — PHARMACY - DISCHARGE MEDICATION RECONCILIATION AND EDUCATION
Pharmacy:  Discharge Counseling and Medication Reconciliation    Opal Mckeebrielle  [address]   [place] [address]  [phone] (home)   64 year old female  PCP: Charlie, Non-Provider    Allergies: Codeine    Discharge Counseling:    Pharmacist met with patient (and/or family) today to review the medication portion of the After Visit Summary (with an emphasis on NEW medications) and to address patient's questions/concerns.    Summary of Education: Did not meet with patient- no changes or new medications. No concerns at this time.    Materials Provided:  MedCounselor sheets printed from Clinical Pharmacology on: N/A    Discharge Medication Reconciliation:    It has been determined that the patient has an adequate supply of medications available or which can be obtained from the patient's preferred pharmacy, which HE/SHE has confirmed as: N/A    Thank you for the consult.    Megan Mary RPH........February 1, 2025 10:34 AM

## 2025-02-03 ENCOUNTER — PATIENT OUTREACH (OUTPATIENT)
Dept: FAMILY MEDICINE | Facility: OTHER | Age: 65
End: 2025-02-03

## 2025-02-03 NOTE — TELEPHONE ENCOUNTER
"  Transitions of Care Outreach  Chief Complaint   Patient presents with    Hospital F/U       Most Recent Admission Date: 1/31/2025   Most Recent Admission Diagnosis: Dizziness - R42     Most Recent Discharge Date: 2/1/2025   Most Recent Discharge Diagnosis: Dizziness - R42     Transitions of Care Assessment    Discharge Assessment  How are you doing now that you are home?: \"pretty much back to normal\"  How are your symptoms? (Red Flag symptoms escalate to triage hotline per guidelines): Improved  Do you know how to contact your clinic care team if you have future questions or changes to your health status? : Yes  Does the patient have their discharge instructions? : Yes  Does the patient have questions regarding their discharge instructions? : No  Were you started on any new medications or were there changes to any of your previous medications? : Yes  Does the patient have all of their medications?: Yes  Do you have questions regarding any of your medications? : No  Do you have all of your needed medical supplies or equipment (DME)?  (i.e. oxygen tank, CPAP, cane, etc.): Yes    Follow up Plan     Discharge Follow-Up  Discharge follow up appointment scheduled in alignment with recommended follow up timeframe or Transitions of Risk Category? (Low = within 30 days; Moderate= within 14 days; High= within 7 days): Yes  Discharge Follow Up Appointment Date: 02/07/25 (patient may call back to reschedule)  Discharge Follow Up Appointment Scheduled with?: Primary Care Provider    Future Appointments   Date Time Provider Department Center   2/7/2025 11:30 AM Bindu Hernandez PA-C [facility] [place]       Outpatient Plan as outlined on AVS reviewed with patient.    For any urgent concerns, please contact our 24 hour nurse triage line: [phone] ([phone])       Maci Alfaro RN    "

## 2025-06-30 ENCOUNTER — LAB (OUTPATIENT)
Dept: LAB | Facility: OTHER | Age: 65
End: 2025-06-30
Attending: PHYSICIAN ASSISTANT

## 2025-06-30 DIAGNOSIS — Z01.89 EXAMINATION FOR, LABORATORY: Primary | ICD-10-CM

## 2025-06-30 PROCEDURE — 36415 COLL VENOUS BLD VENIPUNCTURE: CPT | Mod: ZL

## 2025-06-30 PROCEDURE — 99000 SPECIMEN HANDLING OFFICE-LAB: CPT

## 2025-07-19 ENCOUNTER — HEALTH MAINTENANCE LETTER (OUTPATIENT)
Age: 65
End: 2025-07-19

## (undated) DEVICE — DRSG STERI STRIP 1/2X4" R1547

## (undated) DEVICE — STPL ENDO RELOAD 35X2.5MM VASC WHITE TR35W

## (undated) DEVICE — PREP CHLORAPREP 26ML TINTED ORANGE  260815

## (undated) DEVICE — ESU GROUND PAD ADULT W/CORD E7507

## (undated) DEVICE — DRSG MEDIPORE 2X2 3/4" 3562

## (undated) DEVICE — SYR 10ML LL W/O NDL 302995

## (undated) DEVICE — SUCTION STRYKERFLOW II 250-070-500

## (undated) DEVICE — VERRES NEEDLE 120MM DISPOSABLE 12/BX

## (undated) DEVICE — ENDO TROCAR SLEEVE KII Z-THREADED 05X100MM CTS02

## (undated) DEVICE — ENDO TROCAR FIRST ENTRY KII FIOS Z-THRD 12X100MM CTF73

## (undated) DEVICE — ENDO POUCH UNIV RETRIEVAL SYSTEM INZII 10MM CD001

## (undated) DEVICE — SLEEVE COMPRESSION SCD KNEE MED 74022

## (undated) DEVICE — SOL WATER 1500ML

## (undated) DEVICE — ENDO TROCAR SLEEVE KII 5X100MM CTR03

## (undated) DEVICE — PACK LAPAROSCOPY LF SBA15LPFCA

## (undated) DEVICE — TUBING INSUFFLATOR W/FILTER OLYMPUS WA95005A

## (undated) DEVICE — GLOVE PROTEXIS POWDER FREE SMT 6.5  2D72PT65X

## (undated) DEVICE — SU VICRYL 0 UR-6 27" J603H

## (undated) DEVICE — GLOVE PROTEXIS BLUE W/NEU-THERA 6.5  2D73EB65

## (undated) DEVICE — STPL POWERED ECHELON VASC 35MM PVE35A

## (undated) DEVICE — SU MONOCRYL 4-0 PS-2 27" UND Y426H

## (undated) RX ORDER — ONDANSETRON 2 MG/ML
INJECTION INTRAMUSCULAR; INTRAVENOUS
Status: DISPENSED
Start: 2024-05-15

## (undated) RX ORDER — GLYCOPYRROLATE 0.2 MG/ML
INJECTION, SOLUTION INTRAMUSCULAR; INTRAVENOUS
Status: DISPENSED
Start: 2024-05-15

## (undated) RX ORDER — SCOLOPAMINE TRANSDERMAL SYSTEM 1 MG/1
PATCH, EXTENDED RELEASE TRANSDERMAL
Status: DISPENSED
Start: 2024-05-15

## (undated) RX ORDER — FENTANYL CITRATE-0.9 % NACL/PF 10 MCG/ML
PLASTIC BAG, INJECTION (ML) INTRAVENOUS
Status: DISPENSED
Start: 2024-05-15

## (undated) RX ORDER — HYDROMORPHONE HYDROCHLORIDE 1 MG/ML
INJECTION, SOLUTION INTRAMUSCULAR; INTRAVENOUS; SUBCUTANEOUS
Status: DISPENSED
Start: 2024-05-15

## (undated) RX ORDER — SODIUM CHLORIDE, SODIUM LACTATE, POTASSIUM CHLORIDE, CALCIUM CHLORIDE 600; 310; 30; 20 MG/100ML; MG/100ML; MG/100ML; MG/100ML
INJECTION, SOLUTION INTRAVENOUS
Status: DISPENSED
Start: 2024-05-15

## (undated) RX ORDER — PROPOFOL 10 MG/ML
INJECTION, EMULSION INTRAVENOUS
Status: DISPENSED
Start: 2024-05-15

## (undated) RX ORDER — BUPIVACAINE HYDROCHLORIDE AND EPINEPHRINE 5; 5 MG/ML; UG/ML
INJECTION, SOLUTION EPIDURAL; INTRACAUDAL; PERINEURAL
Status: DISPENSED
Start: 2024-05-15

## (undated) RX ORDER — KETOROLAC TROMETHAMINE 30 MG/ML
INJECTION, SOLUTION INTRAMUSCULAR; INTRAVENOUS
Status: DISPENSED
Start: 2024-05-15

## (undated) RX ORDER — ACETAMINOPHEN 325 MG/1
TABLET ORAL
Status: DISPENSED
Start: 2024-05-15

## (undated) RX ORDER — DEXMEDETOMIDINE HYDROCHLORIDE 4 UG/ML
INJECTION, SOLUTION INTRAVENOUS
Status: DISPENSED
Start: 2024-05-15

## (undated) RX ORDER — MECLIZINE HCL 12.5 MG 12.5 MG/1
TABLET ORAL
Status: DISPENSED
Start: 2025-01-31

## (undated) RX ORDER — ALBUTEROL SULFATE 90 UG/1
AEROSOL, METERED RESPIRATORY (INHALATION)
Status: DISPENSED
Start: 2024-05-15

## (undated) RX ORDER — PIPERACILLIN SODIUM, TAZOBACTAM SODIUM 3; .375 G/15ML; G/15ML
INJECTION, POWDER, LYOPHILIZED, FOR SOLUTION INTRAVENOUS
Status: DISPENSED
Start: 2024-05-15

## (undated) RX ORDER — FENTANYL CITRATE 50 UG/ML
INJECTION, SOLUTION INTRAMUSCULAR; INTRAVENOUS
Status: DISPENSED
Start: 2024-05-15